# Patient Record
Sex: FEMALE | Race: WHITE
[De-identification: names, ages, dates, MRNs, and addresses within clinical notes are randomized per-mention and may not be internally consistent; named-entity substitution may affect disease eponyms.]

---

## 2020-07-27 ENCOUNTER — HOSPITAL ENCOUNTER (OUTPATIENT)
Dept: HOSPITAL 53 - M SFHCPLAZ | Age: 30
End: 2020-07-27
Attending: OBSTETRICS & GYNECOLOGY
Payer: COMMERCIAL

## 2020-07-27 DIAGNOSIS — N89.8: Primary | ICD-10-CM

## 2020-07-27 PROCEDURE — 36415 COLL VENOUS BLD VENIPUNCTURE: CPT

## 2020-07-27 PROCEDURE — 84443 ASSAY THYROID STIM HORMONE: CPT

## 2020-07-27 PROCEDURE — 85027 COMPLETE CBC AUTOMATED: CPT

## 2020-08-05 ENCOUNTER — HOSPITAL ENCOUNTER (OUTPATIENT)
Dept: HOSPITAL 53 - M LAB REF | Age: 30
End: 2020-08-05
Attending: PHYSICIAN ASSISTANT
Payer: COMMERCIAL

## 2020-08-05 DIAGNOSIS — N39.0: Primary | ICD-10-CM

## 2020-08-21 LAB
HCT VFR BLD AUTO: 66.3 % (ref 36–47)
MCH RBC QN AUTO: 29 PG (ref 27–33)
MCHC RBC AUTO-ENTMCNC: 32.6 G/DL (ref 32–36.5)
MCV RBC AUTO: 89.1 FL (ref 80–96)
RBC # BLD AUTO: 7.44 10^6/UL (ref 4–5.4)
WBC # BLD AUTO: 6.7 10^3/UL (ref 4–10)

## 2020-08-22 LAB — HGB BLD-MCNC: 21.6 G/DL (ref 12–15.5)

## 2020-09-03 ENCOUNTER — HOSPITAL ENCOUNTER (OUTPATIENT)
Dept: HOSPITAL 53 - M RAD | Age: 30
End: 2020-09-03
Attending: OBSTETRICS & GYNECOLOGY
Payer: COMMERCIAL

## 2020-09-03 DIAGNOSIS — N93.9: Primary | ICD-10-CM

## 2020-09-03 DIAGNOSIS — R10.2: ICD-10-CM

## 2020-09-08 ENCOUNTER — HOSPITAL ENCOUNTER (OUTPATIENT)
Dept: HOSPITAL 53 - M SFHCWAGY | Age: 30
End: 2020-09-08
Attending: OBSTETRICS & GYNECOLOGY
Payer: COMMERCIAL

## 2020-09-08 DIAGNOSIS — N93.9: Primary | ICD-10-CM

## 2020-09-10 ENCOUNTER — HOSPITAL ENCOUNTER (OUTPATIENT)
Dept: HOSPITAL 53 - M LAB REF | Age: 30
End: 2020-09-10
Attending: NURSE PRACTITIONER
Payer: COMMERCIAL

## 2020-09-10 DIAGNOSIS — R80.9: Primary | ICD-10-CM

## 2020-09-23 ENCOUNTER — HOSPITAL ENCOUNTER (OUTPATIENT)
Dept: HOSPITAL 53 - M LABSMTC | Age: 30
End: 2020-09-23
Attending: ANESTHESIOLOGY
Payer: COMMERCIAL

## 2020-09-23 DIAGNOSIS — Z20.828: ICD-10-CM

## 2020-09-23 DIAGNOSIS — Z01.812: Primary | ICD-10-CM

## 2020-09-25 ENCOUNTER — HOSPITAL ENCOUNTER (OUTPATIENT)
Dept: HOSPITAL 53 - M LAB | Age: 30
End: 2020-09-25
Attending: ADVANCED PRACTICE MIDWIFE
Payer: COMMERCIAL

## 2020-09-25 DIAGNOSIS — N93.9: Primary | ICD-10-CM

## 2020-09-25 LAB
ALBUMIN SERPL BCG-MCNC: 3.6 GM/DL (ref 3.2–5.2)
ALT SERPL W P-5'-P-CCNC: 17 U/L (ref 12–78)
BASOPHILS # BLD AUTO: 0 10^3/UL (ref 0–0.2)
BASOPHILS NFR BLD AUTO: 0.6 % (ref 0–1)
BILIRUB SERPL-MCNC: 1.2 MG/DL (ref 0.2–1)
BUN SERPL-MCNC: 15 MG/DL (ref 7–18)
CALCIUM SERPL-MCNC: 9.2 MG/DL (ref 8.5–10.1)
CHLORIDE SERPL-SCNC: 108 MEQ/L (ref 98–107)
CO2 SERPL-SCNC: 27 MEQ/L (ref 21–32)
CREAT SERPL-MCNC: 0.82 MG/DL (ref 0.55–1.3)
EOSINOPHIL # BLD AUTO: 0.2 10^3/UL (ref 0–0.5)
EOSINOPHIL NFR BLD AUTO: 2.2 % (ref 0–3)
GFR SERPL CREATININE-BSD FRML MDRD: > 60 ML/MIN/{1.73_M2} (ref 60–?)
GLUCOSE SERPL-MCNC: 94 MG/DL (ref 70–100)
HCT VFR BLD AUTO: 41 % (ref 36–47)
HGB BLD-MCNC: 13.4 G/DL (ref 12–15.5)
LYMPHOCYTES # BLD AUTO: 2.6 10^3/UL (ref 1.5–5)
LYMPHOCYTES NFR BLD AUTO: 38.3 % (ref 24–44)
MCH RBC QN AUTO: 28.9 PG (ref 27–33)
MCHC RBC AUTO-ENTMCNC: 32.7 G/DL (ref 32–36.5)
MCV RBC AUTO: 88.6 FL (ref 80–96)
MONOCYTES # BLD AUTO: 0.5 10^3/UL (ref 0–0.8)
MONOCYTES NFR BLD AUTO: 7.6 % (ref 0–5)
NEUTROPHILS # BLD AUTO: 3.4 10^3/UL (ref 1.5–8.5)
NEUTROPHILS NFR BLD AUTO: 51.3 % (ref 36–66)
PLATELET # BLD AUTO: 289 10^3/UL (ref 150–450)
POTASSIUM SERPL-SCNC: 4.7 MEQ/L (ref 3.5–5.1)
PROT SERPL-MCNC: 6.8 GM/DL (ref 6.4–8.2)
RBC # BLD AUTO: 4.63 10^6/UL (ref 4–5.4)
SODIUM SERPL-SCNC: 140 MEQ/L (ref 136–145)
WBC # BLD AUTO: 6.7 10^3/UL (ref 4–10)

## 2020-09-28 ENCOUNTER — HOSPITAL ENCOUNTER (OUTPATIENT)
Dept: HOSPITAL 53 - M SDC | Age: 30
Discharge: HOME | End: 2020-09-28
Attending: OBSTETRICS & GYNECOLOGY
Payer: COMMERCIAL

## 2020-09-28 VITALS — SYSTOLIC BLOOD PRESSURE: 125 MMHG | DIASTOLIC BLOOD PRESSURE: 75 MMHG

## 2020-09-28 VITALS — DIASTOLIC BLOOD PRESSURE: 60 MMHG | SYSTOLIC BLOOD PRESSURE: 108 MMHG

## 2020-09-28 VITALS — DIASTOLIC BLOOD PRESSURE: 58 MMHG | SYSTOLIC BLOOD PRESSURE: 110 MMHG

## 2020-09-28 VITALS — SYSTOLIC BLOOD PRESSURE: 108 MMHG | DIASTOLIC BLOOD PRESSURE: 61 MMHG

## 2020-09-28 VITALS — HEIGHT: 69 IN | BODY MASS INDEX: 25.77 KG/M2 | WEIGHT: 174.01 LBS

## 2020-09-28 VITALS — DIASTOLIC BLOOD PRESSURE: 67 MMHG | SYSTOLIC BLOOD PRESSURE: 123 MMHG

## 2020-09-28 DIAGNOSIS — Q87.81: ICD-10-CM

## 2020-09-28 DIAGNOSIS — Z88.5: ICD-10-CM

## 2020-09-28 DIAGNOSIS — E28.39: ICD-10-CM

## 2020-09-28 DIAGNOSIS — N93.9: Primary | ICD-10-CM

## 2020-09-28 DIAGNOSIS — F32.9: ICD-10-CM

## 2020-09-28 DIAGNOSIS — R10.2: ICD-10-CM

## 2020-09-28 DIAGNOSIS — Z79.899: ICD-10-CM

## 2020-09-28 DIAGNOSIS — N72: ICD-10-CM

## 2020-09-28 DIAGNOSIS — F41.9: ICD-10-CM

## 2020-09-28 LAB
HCT VFR BLD AUTO: 39.4 % (ref 36–47)
HGB BLD-MCNC: 13.2 G/DL (ref 12–15.5)
MCH RBC QN AUTO: 29.2 PG (ref 27–33)
MCHC RBC AUTO-ENTMCNC: 33.5 G/DL (ref 32–36.5)
MCV RBC AUTO: 87.2 FL (ref 80–96)
PLATELET # BLD AUTO: 270 10^3/UL (ref 150–450)
RBC # BLD AUTO: 4.52 10^6/UL (ref 4–5.4)
WBC # BLD AUTO: 5.6 10^3/UL (ref 4–10)

## 2020-09-28 PROCEDURE — 81025 URINE PREGNANCY TEST: CPT

## 2020-09-28 PROCEDURE — 36415 COLL VENOUS BLD VENIPUNCTURE: CPT

## 2020-09-28 PROCEDURE — 96374 THER/PROPH/DIAG INJ IV PUSH: CPT

## 2020-09-28 PROCEDURE — 86901 BLOOD TYPING SEROLOGIC RH(D): CPT

## 2020-09-28 PROCEDURE — 58571 TLH W/T/O 250 G OR LESS: CPT

## 2020-09-28 PROCEDURE — 88307 TISSUE EXAM BY PATHOLOGIST: CPT

## 2020-09-28 PROCEDURE — 85027 COMPLETE CBC AUTOMATED: CPT

## 2020-09-28 PROCEDURE — 96375 TX/PRO/DX INJ NEW DRUG ADDON: CPT

## 2020-09-28 PROCEDURE — 96361 HYDRATE IV INFUSION ADD-ON: CPT

## 2020-09-28 PROCEDURE — 86900 BLOOD TYPING SEROLOGIC ABO: CPT

## 2020-09-28 PROCEDURE — 86850 RBC ANTIBODY SCREEN: CPT

## 2020-09-28 RX ADMIN — FENTANYL CITRATE PRN MCG: 50 INJECTION, SOLUTION INTRAMUSCULAR; INTRAVENOUS at 12:16

## 2020-09-28 RX ADMIN — FENTANYL CITRATE PRN MCG: 50 INJECTION, SOLUTION INTRAMUSCULAR; INTRAVENOUS at 12:00

## 2020-09-28 RX ADMIN — FENTANYL CITRATE PRN MCG: 50 INJECTION, SOLUTION INTRAMUSCULAR; INTRAVENOUS at 12:11

## 2020-09-28 RX ADMIN — FENTANYL CITRATE PRN MCG: 50 INJECTION, SOLUTION INTRAMUSCULAR; INTRAVENOUS at 12:06

## 2020-09-28 NOTE — REP
PELVIC ULTRASOUND 



CLINICAL: Pelvic pain and abnormal pelvic bleeding. 



TECHNIQUE: Transabdominal pelvic ultrasound followed by transvaginal examination
for better evaluation of the endometrium and adnexa.  



FINDINGS: 

Normal anteverted uterus measuring 7.6 x 3.7 x 4.9 cm. The endometrial complex 
measures 11.6 mm thickness. No discrete uterine or endometrial abnormalities are
appreciated. 



Bilateral ovaries are not visualized on either transabdominal or transvaginal 
imaging. No pelvic fluid or adnexal mass lesion is appreciated. 



The bladder is normal and measures 10.2 x 2.4 x 6.4 cm.   



IMPRESSION: 

* Normal appearance to the uterus. 

* Ovaries not visualized. 

* No pelvic fluid or mass.   

MTDD

## 2020-09-28 NOTE — ROOPDOC
[facility] Report Of Operation


Report of Operation


DATE OF PROCEDURE: 09/28/2020





PREPROCEDURE DIAGNOSES: Abnormal uterine bleeding





POSTPROCEDURE DIAGNOSES: Same.





PROCEDURE: Robotic-assisted total laparoscopic hysterectomy, bilateral 

salpingectomy, cystoscopy





SURGEON: Sukhjinder Hughes D.O. FACOG





ASSISTANT: Nithya Mendoza





ANESTHESIA: General endotracheal.





ESTIMATED BLOOD LOSS: Approximately 50 mL. 





FLUIDS REPLACED: 1000 mL LR





URINE OUTPUT: 75 mL





COMPLICATIONS: None. 





FINDINGS: Attenuated streak ovaries bilaterally; consistent with her diagnosis 

of premature ovarian failure.  Uterus was approximately, 8 centimeters in 

greatest dimension.  Cystoscopy: Bilateral ureteral orifice efflux, no bladder 

injury/suture material.





PREOPERATIVE ANTIBIOTIC PROPHYLAXIS: Ancef 2 g IV 1. 





SPECIMEN(S): Uterus w/ cervix, bilateral fallopian tubes





DESCRIPTION OF PROCEDURE:





The patient was counseled, consented on the respective benefits, indications, 

alternatives of procedure. Informed consent was obtained. She was taken to the 

operating room with an IV running. She was placed on the operating table in 

dorsal supine position. Gen. anesthesia was administered and the airway was 

secured without any difficulty. She was placed in the low lithotomy position.  

She was prepared and draped in the normal sterile fashion. A time out was 

performed per protocol.  





A Bullock catheter was placed under sterile conditions. A sterile speculum was 

placed resulting in good visualization of the cervix.  A single-tooth tenaculum 

was used to grasp the anterior lip cervix.  The cervix was sequentially dilated 

with Abdirizak dilators. A V-Care uterine manipulator was placed without any 

difficulty. The single-tooth tenaculum was removed, as well as the speculum. A 

sterile glove switch was performed.  Attention was turned to the abdomen.





An 8mm incision was made at Worrell's point and through this incision a Veress 

needle was inserted into the intraperitoneal cavity. Intraperitoneal placement 

was confirmed with ease of flow of normal saline, positive drop test, no return 

on aspiration, and an opening pressure of less than 10 mmHg upon initial insuf

flation.  The abdomen was insufflated with 2 L of gas.  The Veress needle was 

removed.  Through this incision, the robotic trochar/cannula was inserted into 

the intraperitoneal cavity under direct visualization.  No incidental bleeding 

nor injury was noted.  Patient was placed in 30 Trendelenburg.  The right and 

left trocars/cannulas were placed on both the right and left side through 8 mm 

incisions, guided by laparoscopic visualization.  No incidental bleeding nor 

injury was noted.  The robot was docked in typical fashion.  The instruments 

were inserted, guided by laparoscopic visualization.





My attention was turned to the robotic console.  Using the vessel sealer device,

the right and left fallopian tubes were amputated.  The fallopian tubes were 

brought through the assist-port cannula without any difficulty.  The right 

utero-ovarian ligament and right round ligament were sequentially clamped, 

coagulated and transected with the vessel sealer device.  The vesicouterine 

peritoneum and lower segment uterine to bladder adhesions were dissected with 

the vessel sealer device to create the bladder flap, thus mobilizing the lower 

uterine segment and cervix off of the bladder. The right uterine vasculature was

sequentially clamped, coagulated and transected above and medial to the rim of 

the colpotomy cup. The left utero-ovarian ligament and left round ligament were 

sequentially clamped, coagulated and transected with the vessel sealer device. 

The remainder of the bladder flap was dissected using the vessel sealer device 

and blunt dissection.  The left uterine vasculature was sequentially clamped, 

coagulated and transected above and medial to the rim of the colpotomy cup.  The

outline of the entire V- care colpotomy cup was able to be delineated.  

Excellent blanching of the uterus was noted.  A circumferential colpotomy was 

performed using the da Aye monopolar giuseppe, following the contour of the cup.

 The amputated cervix and uterus were brought through the colpotomy into and out

of the vagina, intact as one unit.   The colpotomy was closed with the V-lock 

barbed suture in running fashion, thus creating the vaginal cuff.  Excellent 

hemostasis was noted throughout the steps above.  Rolando was placed over the 

vaginal cuff to ensure hemostasis.  The instruments were removed from the 

abdomen and the robot was un-docked.  The gas was released from the abdomen and 

the patient was taken out of Trendelenburg.





I re-scrubbed, and attention was turned to the pelvis.  The Bullock catheter was 

removed.  The cystoscope was placed transurethrally into the bladder and normal 

saline was instilled.  No bladder injury/suture material was noted.  IV 

methylene blue had been administered by anesthesia and bilateral UO efflux was 

confirmed.  The fluid was drained out of the bladder through the cystoscope 

device, then the cystoscope was removed.  The vagina was copiously irrigated.  A

sterile digital vaginal exam revealed no significant bleeding and an intact 

vaginal cuff.





A sterile glove switch was performed.  The da Aye cannulas were removed.  The 

skin incisions were closed with 4-0 Monocryl in subcuticular fashion.  Sponge, 

needle and instrument counts were correct per protocol.  The patient tolerated 

the entire procedure very well.  She was transferred to the PACU in good and 

stable condition.





DO DONTRELL Zapata JONATHAN R. DO           Sep 28, 2020 11:57

## 2020-10-07 ENCOUNTER — HOSPITAL ENCOUNTER (OUTPATIENT)
Dept: HOSPITAL 53 - M LAB | Age: 30
End: 2020-10-07
Attending: FAMILY MEDICINE
Payer: COMMERCIAL

## 2020-10-07 DIAGNOSIS — E06.3: ICD-10-CM

## 2020-10-07 DIAGNOSIS — R53.83: ICD-10-CM

## 2020-10-07 DIAGNOSIS — M25.50: Primary | ICD-10-CM

## 2020-10-07 LAB
BASOPHILS # BLD AUTO: 0 10^3/UL (ref 0–0.2)
BASOPHILS NFR BLD AUTO: 0.4 % (ref 0–1)
CRP SERPL-MCNC: < 0.3 MG/DL (ref 0–0.3)
EOSINOPHIL # BLD AUTO: 0.2 10^3/UL (ref 0–0.5)
EOSINOPHIL NFR BLD AUTO: 2.9 % (ref 0–3)
ERYTHROCYTE [SEDIMENTATION RATE] IN BLOOD BY WESTERGREN METHOD: 17 MM/HR (ref 0–20)
FERRITIN SERPL-MCNC: 86 NG/ML (ref 8–252)
HCT VFR BLD AUTO: 39.6 % (ref 36–47)
HGB BLD-MCNC: 12.7 G/DL (ref 12–15.5)
IRON SATN MFR SERPL: 29 % (ref 13.2–45)
IRON SERPL-MCNC: 89 UG/DL (ref 50–170)
LYMPHOCYTES # BLD AUTO: 2 10^3/UL (ref 1.5–5)
LYMPHOCYTES NFR BLD AUTO: 26.6 % (ref 24–44)
MCH RBC QN AUTO: 28.3 PG (ref 27–33)
MCHC RBC AUTO-ENTMCNC: 32.1 G/DL (ref 32–36.5)
MCV RBC AUTO: 88.2 FL (ref 80–96)
MONOCYTES # BLD AUTO: 0.5 10^3/UL (ref 0–0.8)
MONOCYTES NFR BLD AUTO: 6.3 % (ref 0–5)
NEUTROPHILS # BLD AUTO: 4.8 10^3/UL (ref 1.5–8.5)
NEUTROPHILS NFR BLD AUTO: 63.5 % (ref 36–66)
PLATELET # BLD AUTO: 332 10^3/UL (ref 150–450)
RBC # BLD AUTO: 4.49 10^6/UL (ref 4–5.4)
RHEUMATOID FACT SERPL-ACNC: < 10 IU/ML (ref ?–15)
T4 FREE SERPL-MCNC: 1.02 NG/DL (ref 0.76–1.46)
TIBC SERPL-MCNC: 307 UG/DL (ref 250–450)
TSH SERPL DL<=0.005 MIU/L-ACNC: 1.04 UIU/ML (ref 0.36–3.74)
WBC # BLD AUTO: 7.5 10^3/UL (ref 4–10)

## 2020-10-09 LAB
CCP IGG SERPL-ACNC: 6 UNITS (ref 0–19)
GLIADIN PEPTIDE IGA SER-ACNC: 3 UNITS (ref 0–19)
GLIADIN PEPTIDE IGG SER-ACNC: 3 UNITS (ref 0–19)

## 2020-10-28 ENCOUNTER — HOSPITAL ENCOUNTER (OUTPATIENT)
Dept: HOSPITAL 53 - M PAIN | Age: 30
End: 2020-10-28
Attending: NURSE PRACTITIONER
Payer: COMMERCIAL

## 2020-10-28 DIAGNOSIS — J30.81: ICD-10-CM

## 2020-10-28 DIAGNOSIS — M47.817: Primary | ICD-10-CM

## 2020-10-28 DIAGNOSIS — M81.0: ICD-10-CM

## 2020-10-28 DIAGNOSIS — N28.1: ICD-10-CM

## 2020-10-28 DIAGNOSIS — G43.909: ICD-10-CM

## 2020-10-28 DIAGNOSIS — Z88.8: ICD-10-CM

## 2020-10-28 DIAGNOSIS — Z79.899: ICD-10-CM

## 2020-10-28 DIAGNOSIS — Z88.5: ICD-10-CM
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
## 2020-11-04 ENCOUNTER — HOSPITAL ENCOUNTER (OUTPATIENT)
Dept: HOSPITAL 53 - M LABSMTC | Age: 30
End: 2020-11-04
Attending: ANESTHESIOLOGY
Payer: COMMERCIAL

## 2020-11-04 DIAGNOSIS — Z20.828: Primary | ICD-10-CM

## 2020-11-09 ENCOUNTER — HOSPITAL ENCOUNTER (OUTPATIENT)
Dept: HOSPITAL 53 - M PAIN | Age: 30
End: 2020-11-09
Attending: ANESTHESIOLOGY
Payer: COMMERCIAL

## 2020-11-09 DIAGNOSIS — Z88.8: ICD-10-CM

## 2020-11-09 DIAGNOSIS — G43.909: ICD-10-CM

## 2020-11-09 DIAGNOSIS — Z88.5: ICD-10-CM

## 2020-11-09 DIAGNOSIS — M47.816: Primary | ICD-10-CM

## 2020-11-09 DIAGNOSIS — Z79.899: ICD-10-CM

## 2020-11-09 DIAGNOSIS — M47.817: ICD-10-CM

## 2020-11-09 PROCEDURE — 64494 INJ PARAVERT F JNT L/S 2 LEV: CPT

## 2020-11-09 PROCEDURE — 64493 INJ PARAVERT F JNT L/S 1 LEV: CPT

## 2020-11-10 NOTE — REP
INDICATION:

DIAGNOSTIC # 1, BILATERAL L4/L5; L5/S1.



COMPARISON:

None.



TECHNIQUE:

Four views views.  73.1 seconds of fluoroscopy time is reported.



FINDINGS:

A sequence of 4 last image hold fluoroscopically obtained spot radiograph(s) of the

lumbar spine document(s) needle position(s) and contrast injection associated with

injection procedure.



IMPRESSION:

Procedural imaging.





<Electronically signed by Maximus Davis > 11/10/20 0865
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
## 2020-11-23 ENCOUNTER — HOSPITAL ENCOUNTER (OUTPATIENT)
Dept: HOSPITAL 53 - M PAIN | Age: 30
End: 2020-11-23
Attending: NURSE PRACTITIONER
Payer: COMMERCIAL

## 2020-11-23 DIAGNOSIS — Z53.29: Primary | ICD-10-CM

## 2020-11-25 ENCOUNTER — HOSPITAL ENCOUNTER (OUTPATIENT)
Dept: HOSPITAL 53 - M LAB | Age: 30
End: 2020-11-25
Attending: PHYSICIAN ASSISTANT
Payer: COMMERCIAL

## 2020-11-25 DIAGNOSIS — H70.001: ICD-10-CM

## 2020-11-25 DIAGNOSIS — Z01.812: Primary | ICD-10-CM

## 2020-11-25 LAB
BUN SERPL-MCNC: 16 MG/DL (ref 7–18)
CALCIUM SERPL-MCNC: 9.5 MG/DL (ref 8.5–10.1)
CHLORIDE SERPL-SCNC: 107 MEQ/L (ref 98–107)
CO2 SERPL-SCNC: 30 MEQ/L (ref 21–32)
CREAT SERPL-MCNC: 0.86 MG/DL (ref 0.55–1.3)
GFR SERPL CREATININE-BSD FRML MDRD: > 60 ML/MIN/{1.73_M2} (ref 60–?)
GLUCOSE SERPL-MCNC: 96 MG/DL (ref 70–100)
POTASSIUM SERPL-SCNC: 4.3 MEQ/L (ref 3.5–5.1)
SODIUM SERPL-SCNC: 141 MEQ/L (ref 136–145)

## 2020-12-01 ENCOUNTER — HOSPITAL ENCOUNTER (OUTPATIENT)
Dept: HOSPITAL 53 - M RAD | Age: 30
End: 2020-12-01
Attending: PHYSICIAN ASSISTANT
Payer: COMMERCIAL

## 2020-12-01 ENCOUNTER — HOSPITAL ENCOUNTER (OUTPATIENT)
Dept: HOSPITAL 53 - M PAIN | Age: 30
End: 2020-12-01
Attending: NURSE PRACTITIONER
Payer: COMMERCIAL

## 2020-12-01 DIAGNOSIS — G43.909: ICD-10-CM

## 2020-12-01 DIAGNOSIS — M47.816: ICD-10-CM

## 2020-12-01 DIAGNOSIS — Z88.5: ICD-10-CM

## 2020-12-01 DIAGNOSIS — Z79.899: ICD-10-CM

## 2020-12-01 DIAGNOSIS — Z88.8: ICD-10-CM

## 2020-12-01 DIAGNOSIS — M47.816: Primary | ICD-10-CM

## 2020-12-01 DIAGNOSIS — H70.001: Primary | ICD-10-CM

## 2020-12-01 PROCEDURE — 70480 CT ORBIT/EAR/FOSSA W/O DYE: CPT

## 2020-12-01 NOTE — REPVR
PROCEDURE INFORMATION: 

Exam: CT Temporal Bones Without Contrast. 

Exam date and time: 12/1/2020 10:15 AM 

Age: 30 years old 

Clinical indication: Pain; Other: RT ear; Additional info: Mastoiditis 



TECHNIQUE: 

Imaging protocol: Computed tomography images of the temporal bones without 

contrast. 

Radiation optimization: All CT scans at this facility use at least one of these 

dose optimization techniques: automated exposure control; mA and/or kV 

adjustment per patient size (includes targeted exams where dose is matched to 

clinical indication); or iterative reconstruction. 



COMPARISON: 

No relevant prior studies available. 



FINDINGS: 

Right inner ear: Normal. 

Right ossicles and middle ear: Normal. The middle ear ossicles are intact. 

Right external auditory canal: Normal. 

Right facial nerve canal: Normal. 

Right jugular foramen: No jugular dehiscence. 

Right carotid canal: No aberrent carotid canal. 

Right mastoid air cells: Normal. No mastoid effusions. 



Left inner ear: Normal. 

Left ossicles and middle ear: Normal. The middle ear ossicles are intact. 

Left external auditory canal: Normal. 

Left facial nerve canal: Normal. 

Left jugular foramen: No jugular dehiscence. 

Left carotid canal: No aberrent carotid canal. 

Left mastoid air cells: Normal. No mastoid effusions. 



Paranasal sinuses: Retention cyst or polyp in the right maxillary sinus. 



Soft tissues: The left facial canal is dehiscent. 



IMPRESSION: 

No evidence of mastoiditis. 



Electronically signed by: Selena Gamboa On 12/01/2020  10:42:38 AM
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
## 2021-01-07 ENCOUNTER — HOSPITAL ENCOUNTER (OUTPATIENT)
Dept: HOSPITAL 53 - M LABSMTC | Age: 31
End: 2021-01-07
Attending: ANESTHESIOLOGY
Payer: COMMERCIAL

## 2021-01-07 DIAGNOSIS — Z20.822: Primary | ICD-10-CM

## 2021-01-12 ENCOUNTER — HOSPITAL ENCOUNTER (OUTPATIENT)
Dept: HOSPITAL 53 - M PAIN | Age: 31
End: 2021-01-12
Attending: ANESTHESIOLOGY
Payer: COMMERCIAL

## 2021-01-12 DIAGNOSIS — Z79.899: ICD-10-CM

## 2021-01-12 DIAGNOSIS — M47.817: ICD-10-CM

## 2021-01-12 DIAGNOSIS — G43.909: ICD-10-CM

## 2021-01-12 DIAGNOSIS — Z88.5: ICD-10-CM

## 2021-01-12 DIAGNOSIS — Z88.8: ICD-10-CM

## 2021-01-12 DIAGNOSIS — M47.816: Primary | ICD-10-CM

## 2021-01-12 PROCEDURE — 64493 INJ PARAVERT F JNT L/S 1 LEV: CPT

## 2021-01-12 PROCEDURE — 64494 INJ PARAVERT F JNT L/S 2 LEV: CPT

## 2021-01-12 NOTE — REP
INDICATION:

RIGHT DIAGNOSTIC #2.



COMPARISON:

None



TECHNIQUE:

Intraoperative fluoroscopic imaging using portable C-arm technique



FINDINGS:

Images demonstrate the patient to be status post lumbar facet block.  Total

fluoroscopic time 26.4 seconds



IMPRESSION:

Satisfactory lumbar facet block images





<Electronically signed by Lakhwinder Welsh > 01/12/21 [id number]
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
## 2021-01-26 ENCOUNTER — HOSPITAL ENCOUNTER (OUTPATIENT)
Dept: HOSPITAL 53 - M PAIN | Age: 31
End: 2021-01-26
Attending: NURSE PRACTITIONER
Payer: COMMERCIAL

## 2021-01-26 DIAGNOSIS — G89.29: Primary | ICD-10-CM

## 2021-01-26 DIAGNOSIS — M81.0: ICD-10-CM

## 2021-01-26 DIAGNOSIS — M47.816: ICD-10-CM

## 2021-01-26 DIAGNOSIS — Z79.899: ICD-10-CM

## 2021-01-26 DIAGNOSIS — Z88.5: ICD-10-CM

## 2021-01-26 DIAGNOSIS — Z88.8: ICD-10-CM

## 2021-01-26 DIAGNOSIS — G43.909: ICD-10-CM

## 2021-01-26 DIAGNOSIS — J30.81: ICD-10-CM

## 2021-01-28 NOTE — ECWPNPC
PATIENT NAME: COBY SHIELDS

: 1990

GENDER: FEMALE

MRN: [id number]

VISIT DATE: 2021

DISCHARGE DATE: 21 1229

VISIT LOCKED DATE TIME: 

PHYSICIAN: MANJULA BARRON

RESOURCE: MANJULA BARRON

 

           

           

REASON FOR APPOINTMENT

           

          1. RIGHT DIAGNOSTIC LUMBAR FACET BLOCK L4-5, L5-S1 #2

           

HISTORY OF PRESENT ILLNESS

           

      GENERAL:  HERE FOR POST PROCEDURE FOLLOW-UP. HAD

          RIGHT L4-5, L5-S1 DIAGNOSTIC LUMBAR FACET BLOCK #2 ON 2021.

          HOURLY PAIN DIARY IS REVIEWED. THIS IS SHOWING A 80% REDUCTION IN

          PAIN FOR 24 HOURS POSTPROCEDURE THEN PAIN RETURNED TO BASELINE.

          REPORTS THAT DURING THE 24 HOURS POST PROCEDURE SHE WAS ABLE TO

          INTERACT WITH HER CHILDREN AND ANIMALS WITHOUT INCREASED PAIN FOR

          THE FIRST TIME IN SEVERAL YEARS. REVIEWED RADIOFREQUENCY

          PROCEDURE.

           - -.

           

      FALL RISK SCREENING:

      SCREENING

           

           

          :NO FALLS REPORTED IN THE LAST YEAR

           

      PAIN SCREENING:

      PATIENT HAS A COMPLAINT OF ACUTE OR CHRONIC PAIN

           

           

          :YES

          LOCATION OF PAIN:LOW BACK, LEFT HIP, RIGHT HIP

          INTENSITY OF PAIN (SCALE OF 1 TO 10):7

          WHAT DOES YOUR PAIN FEEL LIKE:ACHING, INTERMITTENT, STABBING

          DURATION:INTERMITTENT

          PAIN IS INCREASED BY:ACTIVITIES

          PAIN IS DECREASED BY:OTHERS HOT BATHS, HEAT

          TREATMENT/MEDICATIONS USED TO MANAGE PAIN:OTC PAIN RELIEVERS,

          NSAIDS, PHYSICAL THERAPY

          LEVEL OF RELIEF FROM PAIN TREATMENTS IN THE PAST:25%

           

      NURSING NOTE:

           -.

           

      PAIN CENTER INTAKE QUESTIONS:

      DO YOU HAVE A HISTORY OF MRSA?

           

           

          :NO

           

      DO YOU TAKE A BLOOD THINNERS?

           

           

          :NO

           

      DO YOU HAVE ANY BLEEDING DISORDERS?

           

           

          :NO

           

      ANY NEW NUMBNESS OR WEAKNESS IN YOUR LEGS OR ARMS?

           

           

          :NO

           

      ANY PACEMAKER,DEFIBRILLATOR, OR DORSAL COLUMN

          STIMULATOR?

           

           

          :NO

           

      DO YOU HAVE ANY RASHES OR OPEN SORES?

           

           

          :NO

           

      ARE YOU ALLERGIC TO IV DYE?

           

           

          :NO

           

      ARE YOU DIABETIC?

           

           

          :NO

           

      ANY NEW PROBLEMS WITH YOUR MEDICATIONS?

           

           

          :NO

           

      HAVE YOU RECEIVED A VACCINE IN THE PAST 30 DAYS?

           

           

          :NO

           

      DO YOU PLAN TO RECEIVE A VACCINE IN THE NEXT 21

          DAYS?

           

           

          :NO

           

      DO YOU NEED ANY PRESCRIPTION?

           

           

          :NO

           

      DO YOU TAKE ANY IMMUNOSUPPRESSIVE MEDICATIONS?

           

           

          :NO

           

      IS THERE A CHANCE YOU COULD BE PREGNANT?

           

           

          :NO

           

      ARE YOU BREAST FEEDING?

           

           

          :NO

           

CURRENT MEDICATIONS

           

          NOT-TAKING GABAPENTIN 400 MG CAPSULE 1 CAPSULE ORALLY ONCE A DAY

          NOT-TAKING HYDROXYZINE HCL 25 MG TABLET 1 TABLET AS NEEDED ORALLY

          EVERY 8 HRS, NOTES: NOT LATELY

          UNKNOWN FLONASE ALLERGY RELIEF 50 MCG/ACT SUSPENSION 1 SPRAY IN

          EACH NOSTRIL NASALLY ONCE A DAY

          UNKNOWN MELATONIN 3 MG TABLET AS DIRECTED ORALLY

          UNKNOWN CALCIUM 1000 + D 1000-800 MG-UNIT TABLET 1 TABLET WITH A

          MEAL ORALLY ONCE A DAY

          UNKNOWN WELLBUTRIN  MG TABLET EXTENDED RELEASE 24 HOUR 1

          TABLET IN THE MORNING ORALLY ONCE A DAY

          UNKNOWN AMBIEN 10 MG TABLET 1 TABLET AT BEDTIME AS NEEDED ORALLY

          ONCE A DAY

          UNKNOWN VITAMIN D 1000 UNIT TABLET 1 TABLET ORALLY ONCE A DAY

          UNKNOWN ZOLOFT 25 MG TABLET 1 TABLET ORALLY ONCE A DAY

          UNKNOWN CEFACLOR 250 MG CAPSULE 1 CAPSULE ORALLY EVERY 8 HRS AS

          NEEDED, NOTES: NONE IN 3 MOS

          UNKNOWN GABAPENTIN 600 MG TABLET 1 TABLET ORALLY BID

          UNKNOWN NORETHINDRONE ACETATE 5 MG TABLET 1 TABLET ORALLY ONCE A

          DAY

          UNKNOWN ESTRACE 2 MG TABLET 1 TABLET ORALLY ONCE A DAY

          UNKNOWN NORETHINDRONE ACETATE 5 MG TABLET 1 TABLET ORALLY ONCE A

          DAY

          UNKNOWN HYDRALAZINE HCL 25 MG TABLET 1 TABLET WITH FOOD ORALLY

          THREE TIMES A DAY

          MEDICATION LIST REVIEWED AND RECONCILED WITH THE PATIENT

           

PAST MEDICAL HISTORY

           

          UTIS

          KIDNEY CYST

          CHRONIC BACK PAIN

          MIGRAINE

          PREMATURE OVARIAN FAILURE

          OSTEOPOROSIS

          DEGENERATIVE DISK DISEASE

          ALPORTS SYNDROME

           

ALLERGIES

           

          VICODIN: N/V, RASH - ALLERGY

          CAT ALLERGY: CONGESTION - ALLERGY

          LEXAPRO: PROLONGED QT

           

SOCIAL HISTORY

           

          GENERAL:

           

          TOBACCO USE

          ARE YOU A:NONSMOKER

           

           

          LATEX QUESTIONNAIRE

          LATEX ALLERGY : HAVE YOU EVER DEVELOPED ANY TYPE OF REACTION

          AFTER HANDLING LATEX PRODUCTS SUCH AS RUBBER GLOVES, CONDOMS,

          DIAPHRAGMS, BALLOONS, SOCKS, OR UNDERWEAR?NO

          LATEX ALLERGY : HAVE YOU EVER DEVELOPED ANY TYPE OF REACTION

          DURING OR AFTER DENTAL APPOINTMENT, VAGINAL/RECTAL EXAMINATION,

          SURGICAL PROCEDURE, OR ANY OTHER EXPOSURE?NO

          LATEX RISK : HAVE YOU EVER HAD ANY DIFFICULTY BREATHING OR HIVES

          AFTER EATING OR HANDLING ANY FRUITS, OR VEGETABLES; SUCH AS KIWI,

          BANANAS, STONE FRUITS, OR CHESTNUTSNO

          LATEX RISK : DO YOU HAVE A PREVIOUS PERSONAL HISTORY OF MORE THAN

          NINE SURGERIES, SPINA BIFIDA, OR REPEATED CATHERIZATIONS? NO

          LATEX RISK : ARE YOU FREQUENTLY EXPOSED TO LATEX PRODUCTS IN YOUR

          OCCUPATION?NO

          DATE ASKED : 2021

           

           

          ALCOHOL USE: NO.

           

           

          ALCOHOL SCREENING

          DID YOU HAVE A DRINK CONTAINING ALCOHOL IN THE PAST YEAR?NO

          POINTS0

          INTERPRETATIONNEGATIVE

           

           

          RECREATIONAL DRUG USE

          DRUG USE?NO

           

           

          CAFFEINE

          CAFFEINE USE?YES 2 CUPS DAY

           

           

          [employer]ian

          [id number] Catholic NO Hinduism BELIEFS THAT WOULD IMPACT

          HEALTH CARE.

           

           

          LANGUAGE

          LANGUAGES SPOKEN:ENGLISH

           

           

          LEARNING BARRIERS / SPECIAL NEEDS

          BARRIERS TO LEARNING?NO

          HEARING IMPAIRED?NO

          VISION IMPAIRED?YES

          :CORRECTIVE LENSES

          COGNITIVELY IMPAIRED?NO

          READINESS TO LEARN?YES

          LEARNING PREFERENCES?NO

          LEARNING CAPABILITIES PRESENT?YES

          EMOTIONAL BARRIERS?NO

          SPECIAL DEVICES?NO

           NEEDED?NO

           

           

          DOMESTIC VIOLENCE

          STATUS:

          DO YOU FEEL SAFE IN YOUR ENVIRONMENT?YES

           

           

          OCCUPATION: SELF EMPLOYED.

           

           

          DIET: REGULAR.

           

           

          EXERCISE: DAILY.

           

           

          MARITAL STATUS: .

           

           

          -

          HAS THE PATIENT BEEN EDUCATED REGARDING HIS/HER PLAN OF CARE?YES

          HAS THE PATIENT BEEN EDUCATED REGARDING PAIN, THE RISK FOR PAIN,

          THE IMPORTANCE OF EFFECTIVE PAIN MANAGEMENT, AND THE PAIN

          ASSESSMENT PROCESS?YES

           

           

          HOUSING: [employer]ENTS APARTMENT.

           

           

          ADVANCE DIRECTIVE

          ADVANCE DIRECTIVE DISCUSSED WITH PATIENT:YES DECLINED, DECLINED

          PAPERWORK

           

REVIEW OF SYSTEMS

           

      CONSTITUTIONAL:

           

          ANY RECENT FEVER    NO . CHILLS    NO . WEIGHT CHANGE OF UNKNOWN

          REASONS    NO .

           

      GASTROENTEROLOGY:

           

          NEW UNEXPLAINABLE CHANGES IN BOWEL CONTROL    NO . CONSTIPATION  

           NO .

           

      GENITOURINARY:

           

          ANY NEW CHANGE IN BLADDER CONTROL?    NO .

           

      NEUROLOGY:

           

          NEW ONSET DIZZINESS OR NEUROLOGICAL CHANGES NOT MENTIONED    NO .

          NEW NUMBNESS OR PAIN PATTERNS NOT MENTIONED AND PERTINENT TO

          TODAY'S VISIT    NO .

           

      CARDIOLOGY:

           

          NEW CHEST PRESSURE    NO . NEW CHEST PAIN    NO .

           

      RESPIRATORY:

           

          UNEXPLAINABLE COUGH    NO . NEW SHORTNESS OF BREATH    NO .

           

VITAL SIGNS

           

          .8 LBS, HT 70 IN, BMI 25.37 INDEX, /77 MM HG, HR 69

          /MIN, RR 18 /MIN, TEMP 98 F, OXYGEN SAT % 99%, SAFE IN ENV? (Y/N)

          YES, REVIEWED BY: INOCENTE NOVOA MA.

           

EXAMINATION

           

      GENERAL EXAMINATION:

          GENERAL AWAKE,ALERT ,PLEAASANT .

           

          PSYCH AFFECT NORMAL .

           

          LUNGS: LUNG FIELDS ARE CLEAR TO AUSCULTATION BILATERALLY. GOOD

          MOVEMENT OF AIR .

           

          HEART: S1, S2 IN A REGULAR RATE AND RHYTHM. NO SIGNIFICANT

          MURMURS, RUBS OR GALLOPS NOTED .

           

          LUMBAR:PALPATION:TENDER OVER BILAT. L4/5-L5/S1 LUMBAR FACETS WITH

          FACET LOADING..

           

ASSESSMENTS

           

          OTHER CHRONIC PAIN - G89.29 (PRIMARY)

           

          SPONDYLOSIS WITHOUT MYELOPATHY OR RADICULOPATHY, LUMBAR REGION -

          M47.816

           

TREATMENT

           

      OTHER CHRONIC PAIN

          PAIN PROCEDURE LOGDATE OF PROCEDURE1PROCEDURE:RIGHT

          DIAGNOSTIC LUMBAR FACET BLOCK #2 L4-5, L5-[id number] OF PRE

          SEDATENONERESULT:GREATER THAN 80% REDUCTION IN PAIN X24 HOURS

          NOTES: RIGHT COOL RADIOFREQUENCY L4-5, L5-S1.

           

PROCEDURE CODES

           

           ESTABILISHED PATIENT [facility] CHARGE

           

DISPOSITION & COMMUNICATION

           

FOLLOW UP

           

          POST PROCEDURE (REASON: RIGHT COOL RADIOFREQUENCY L4-5, L5-S1)

           

 

ELECTRONICALLY SIGNED BY MISAEL GIVENS ON

          2021 AT 12:59 PM EST

           

           

           

 

DISCLAIMER :

THIS IS A VISIT SUMMARY EXTRACTED FROM THE [employer] CHART.

IT IS NOT A COPY OF THE [employer] PROGRESS NOTE.

FADY

## 2021-02-04 ENCOUNTER — HOSPITAL ENCOUNTER (OUTPATIENT)
Dept: HOSPITAL 53 - M LABSMTC | Age: 31
End: 2021-02-04
Attending: ANESTHESIOLOGY
Payer: COMMERCIAL

## 2021-02-04 DIAGNOSIS — Z11.59: ICD-10-CM

## 2021-02-04 DIAGNOSIS — Z20.828: Primary | ICD-10-CM

## 2021-02-09 ENCOUNTER — HOSPITAL ENCOUNTER (OUTPATIENT)
Dept: HOSPITAL 53 - M LAB | Age: 31
End: 2021-02-09
Attending: PHYSICIAN ASSISTANT
Payer: COMMERCIAL

## 2021-02-09 ENCOUNTER — HOSPITAL ENCOUNTER (OUTPATIENT)
Dept: HOSPITAL 53 - M PAIN | Age: 31
End: 2021-02-09
Attending: ANESTHESIOLOGY
Payer: COMMERCIAL

## 2021-02-09 DIAGNOSIS — M25.50: ICD-10-CM

## 2021-02-09 DIAGNOSIS — G43.909: ICD-10-CM

## 2021-02-09 DIAGNOSIS — Z88.8: ICD-10-CM

## 2021-02-09 DIAGNOSIS — M47.816: Primary | ICD-10-CM

## 2021-02-09 DIAGNOSIS — M47.817: ICD-10-CM

## 2021-02-09 DIAGNOSIS — Z88.5: ICD-10-CM

## 2021-02-09 DIAGNOSIS — Z79.899: ICD-10-CM

## 2021-02-09 PROCEDURE — 36415 COLL VENOUS BLD VENIPUNCTURE: CPT

## 2021-02-09 PROCEDURE — 87798 DETECT AGENT NOS DNA AMP: CPT

## 2021-02-09 PROCEDURE — 86200 CCP ANTIBODY: CPT

## 2021-02-09 PROCEDURE — 86803 HEPATITIS C AB TEST: CPT

## 2021-02-09 PROCEDURE — 86704 HEP B CORE ANTIBODY TOTAL: CPT

## 2021-02-09 PROCEDURE — 86480 TB TEST CELL IMMUN MEASURE: CPT

## 2021-02-09 PROCEDURE — 64635 DESTROY LUMB/SAC FACET JNT: CPT

## 2021-02-09 PROCEDURE — 86038 ANTINUCLEAR ANTIBODIES: CPT

## 2021-02-09 PROCEDURE — 81374 HLA I TYPING 1 ANTIGEN LR: CPT

## 2021-02-09 PROCEDURE — 64636 DESTROY L/S FACET JNT ADDL: CPT

## 2021-02-09 NOTE — REP
INDICATION:

RIGHT COOL RADIOFREQUENCY L4-L5, L5-S1.



COMPARISON:

01/12/2021.



TECHNIQUE:

Five C-arm views lower lumbar spine performed during injection.



FINDINGS:

Needles are seen along the lower lumbar spine and lumbosacral junction.



IMPRESSION:

1 minutes 5 seconds fluoroscopy time utilized.





<Electronically signed by Roberto Carlos Hood > 02/09/21 1894

## 2021-02-11 NOTE — ECWPNPC
PATIENT NAME: COBY SHIELDS

: 1990

GENDER: FEMALE

MRN: [id number]

VISIT DATE: 2021

DISCHARGE DATE: 21 1523

VISIT LOCKED DATE TIME: 

PHYSICIAN: DIANA THOMPSON MD

RESOURCE: DIANA THOMPSON MD

 

           

           

REASON FOR APPOINTMENT

           

          1. RIGHT LUMBAR COOL RADIOFREQUENCY L4-L5, L5-S1

           

HISTORY OF PRESENT ILLNESS

           

      GENERAL:

           -.

           

      FALL RISK SCREENING:

      SCREENING

           

           

          :NO FALLS REPORTED IN THE LAST YEAR

           

      PAIN SCREENING:

      PATIENT HAS A COMPLAINT OF ACUTE OR CHRONIC PAIN

           

           

          :YES

          LOCATION OF PAIN:LOW BACK, LEFT HIP, RIGHT HIP

          INTENSITY OF PAIN (SCALE OF 1 TO 10):8

          WHAT DOES YOUR PAIN FEEL LIKE:ACHING, SHARP, THROBBING

          DURATION:CONTINOUS, CONSTANT

          PAIN IS INCREASED BY:ACTIVITIES

          PAIN IS DECREASED BY:USE OF PAIN MEDICATIONS

           

      NURSING NOTE:

           -.

           

      PAIN CENTER INTAKE QUESTIONS:

      DO YOU HAVE A HISTORY OF MRSA?

           

           

          :NO

           

      DO YOU TAKE A BLOOD THINNERS?

           

           

          :NO

           

      DO YOU HAVE ANY BLEEDING DISORDERS?

           

           

          :NO

           

      ANY NEW NUMBNESS OR WEAKNESS IN YOUR LEGS OR ARMS?

           

           

          :NO

           

      ANY PACEMAKER,DEFIBRILLATOR, OR DORSAL COLUMN

          STIMULATOR?

           

           

          :NO

           

      DO YOU HAVE ANY RASHES OR OPEN SORES?

           

           

          :NO

           

      ARE YOU ALLERGIC TO IV DYE?

           

           

          :NO

           

      ARE YOU DIABETIC?

           

           

          :NO

           

      ANY NEW PROBLEMS WITH YOUR MEDICATIONS?

           

           

          :NO

           

      HAVE YOU RECEIVED A VACCINE IN THE PAST 30 DAYS?

           

           

          :NO

           

      DO YOU PLAN TO RECEIVE A VACCINE IN THE NEXT 21

          DAYS?

           

           

          :NO

           

      DO YOU TAKE ANY IMMUNOSUPPRESSIVE MEDICATIONS?

           

           

          :NO

           

      ANY HISTORY OF SEIZURES?

           

           

          :NO

           

      ANY HISTORY OF CARDIAC ISSUES OR EVENTS?

           

           

          :YES LEXAPRO CAUSES PROLONGED QT INTERVAL

           

      DO YOU HAVE SLEEP APNEA?

           

           

          :NO

           

      ANY RECENT HEAD INJURY?

           

           

          :NO

           

      DO YOU HAVE ANY NEW INFECTIONS?

           

           

          :NO

           

      IS THERE A CHANCE YOU COULD BE PREGNANT?

           

           

          :NO

           

      ARE YOU BREAST FEEDING?

           

           

          :NO

           

      WHEN DID YOU LAST EAT?

           

           

          : 630

           

      WHEN DID YOU LAST DRINK?

           

           

          : 1050

           

      WHAT DID YOU LAST DRINK?

           

           

          : WATER

           

      NAME OF PERSON DRIVING YOU HOME?

           

           

          : -JEOVANNY

           

      DO YOU HAVE ANY OTHER QUESTIONS OR CONCERNS?

           

           

          : NONE

           

CURRENT MEDICATIONS

           

          TAKING FLONASE ALLERGY RELIEF 50 MCG/ACT SUSPENSION 1 SPRAY IN

          EACH NOSTRIL NASALLY ONCE A DAY

          TAKING MELATONIN 3 MG TABLET AS DIRECTED ORALLY

          TAKING CALCIUM 1000 + D 1000-800 MG-UNIT TABLET 1 TABLET WITH A

          MEAL ORALLY ONCE A DAY

          TAKING WELLBUTRIN  MG TABLET EXTENDED RELEASE 24 HOUR 1

          TABLET IN THE MORNING ORALLY ONCE A DAY, NOTES: 2100

          TAKING AMBIEN 10 MG TABLET 1 TABLET AT BEDTIME AS NEEDED ORALLY

          ONCE A DAY

          TAKING VITAMIN D 1000 UNIT TABLET 1 TABLET ORALLY ONCE A DAY

          TAKING ZOLOFT 25 MG TABLET 1 TABLET ORALLY ONCE A DAY

          TAKING CEFACLOR 250 MG CAPSULE 1 CAPSULE ORALLY EVERY 8 HRS AS

          NEEDED, NOTES: NONE IN 3 MOS

          TAKING GABAPENTIN 600 MG TABLET 1 TABLET ORALLY BID, NOTES: 2100

          NOT-TAKING GABAPENTIN 400 MG CAPSULE 1 CAPSULE ORALLY ONCE A DAY

          NOT-TAKING HYDROXYZINE HCL 25 MG TABLET 1 TABLET AS NEEDED ORALLY

          EVERY 8 HRS, NOTES: NOT LATELY

          NOT-TAKING NORETHINDRONE ACETATE 5 MG TABLET 1 TABLET ORALLY ONCE

          A DAY

          NOT-TAKING ESTRACE 2 MG TABLET 1 TABLET ORALLY ONCE A DAY

          NOT-TAKING NORETHINDRONE ACETATE 5 MG TABLET 1 TABLET ORALLY ONCE

          A DAY

          NOT-TAKING HYDRALAZINE HCL 25 MG TABLET 1 TABLET WITH FOOD ORALLY

          THREE TIMES A DAY

          MEDICATION LIST REVIEWED AND RECONCILED WITH THE PATIENT

           

PAST MEDICAL HISTORY

           

          UTIS

          KIDNEY CYST

          CHRONIC BACK PAIN

          MIGRAINE

          PREMATURE OVARIAN FAILURE

          OSTEOPOROSIS

          DEGENERATIVE DISK DISEASE

          ALPORTS SYNDROME

           

ALLERGIES

           

          VICODIN: N/V, RASH - ALLERGY

          CAT ALLERGY: CONGESTION - ALLERGY

          LEXAPRO: PROLONGED QT - SIDE EFFECTS

           

SOCIAL HISTORY

           

          GENERAL:

           

          TOBACCO USE

          ARE YOU A:NONSMOKER

           

           

          LATEX QUESTIONNAIRE

          LATEX ALLERGY : HAVE YOU EVER DEVELOPED ANY TYPE OF REACTION

          AFTER HANDLING LATEX PRODUCTS SUCH AS RUBBER GLOVES, CONDOMS,

          DIAPHRAGMS, BALLOONS, SOCKS, OR UNDERWEAR?NO

          LATEX ALLERGY : HAVE YOU EVER DEVELOPED ANY TYPE OF REACTION

          DURING OR AFTER DENTAL APPOINTMENT, VAGINAL/RECTAL EXAMINATION,

          SURGICAL PROCEDURE, OR ANY OTHER EXPOSURE?NO

          DATE ASKED : 2021

          LATEX RISK : HAVE YOU EVER HAD ANY DIFFICULTY BREATHING OR HIVES

          AFTER EATING OR HANDLING ANY FRUITS, OR VEGETABLES; SUCH AS KIWI,

          BANANAS, STONE FRUITS, OR CHESTNUTSNO

          LATEX RISK : DO YOU HAVE A PREVIOUS PERSONAL HISTORY OF MORE THAN

          NINE SURGERIES, SPINA BIFIDA, OR REPEATED CATHERIZATIONS? NO

          LATEX RISK : ARE YOU FREQUENTLY EXPOSED TO LATEX PRODUCTS IN YOUR

          OCCUPATION?NO

           

           

          ALCOHOL USE: NO.

           

           

          ALCOHOL SCREENING

          DID YOU HAVE A DRINK CONTAINING ALCOHOL IN THE PAST YEAR?NO

          POINTS0

          INTERPRETATIONNEGATIVE

           

           

          RECREATIONAL DRUG USE

          DRUG USE?NO

           

           

          CAFFEINE

          CAFFEINE USE?YES 2 CUPS DAY

           

           

          Episcopalian

          [id number] Worship NO Zoroastrian BELIEFS THAT WOULD IMPACT

          HEALTH CARE.

           

           

          LANGUAGE

          LANGUAGES SPOKEN:ENGLISH

           

           

          LEARNING BARRIERS / SPECIAL NEEDS

          BARRIERS TO LEARNING?NO

          HEARING IMPAIRED?NO

          VISION IMPAIRED?YES

          COGNITIVELY IMPAIRED?NO

          :CORRECTIVE LENSES

          READINESS TO LEARN?YES

          LEARNING PREFERENCES?NO

          LEARNING CAPABILITIES PRESENT?YES

          EMOTIONAL BARRIERS?NO

          SPECIAL DEVICES?NO

           NEEDED?NO

           

           

          DOMESTIC VIOLENCE

          STATUS:

          DO YOU FEEL SAFE IN YOUR ENVIRONMENT?YES

           

           

          OCCUPATION: SELF EMPLOYED.

           

           

          DIET: REGULAR.

           

           

          EXERCISE: DAILY.

           

           

          MARITAL STATUS: .

           

           

          -

          HAS THE PATIENT BEEN EDUCATED REGARDING HIS/HER PLAN OF CARE?YES

          HAS THE PATIENT BEEN EDUCATED REGARDING PAIN, THE RISK FOR PAIN,

          THE IMPORTANCE OF EFFECTIVE PAIN MANAGEMENT, AND THE PAIN

          ASSESSMENT PROCESS?YES

           

           

          HOUSING: RENTS APARTMENT.

           

           

          ADVANCE DIRECTIVE

          ADVANCE DIRECTIVE DISCUSSED WITH PATIENT:YES PT DOES NOT HAVE ANY

          ADVANCED DIRECTIVES AND SHE DECLINED INFORMATION ON HCP AT THIS

          TIME

           

VITAL SIGNS

           

          .4 LBS, HT 70 IN, BMI 25.02 INDEX, /72 MM HG, HR 68

          /MIN, RR 18 /MIN, TEMP 96.9 F, OXYGEN SAT % 96%, SAFE IN ENV?

          (Y/N) Y, NA INITIALS AW 1304, REVIEWED BY: VICENTA GRANADOS RN.

           

EXAMINATION

           

      GENERAL EXAMINATION: THE PATIENT IS ALERT, ORIENTED

          TIMES THREE AND COOPERATIVE. LUNGS ARE CLEAR TO AUSCULTATION.

          HEART SHOWS REGULAR RHYTHM, NO MURMURS AND NO GALLOPS.

           

ASSESSMENTS

           

          SPONDYLOSIS WITHOUT MYELOPATHY OR RADICULOPATHY, LUMBAR REGION -

          M47.816 (PRIMARY)

           

          SPONDYLOSIS WITHOUT MYELOPATHY OR RADICULOPATHY, LUMBOSACRAL

          REGION - M47.817

           

TREATMENT

           

      SPONDYLOSIS WITHOUT MYELOPATHY OR RADICULOPATHY,

          LUMBAR REGION

          [facility] FACET BLOCK (PAIN)[id number]

          MEDICATION: VALIUM TAB 10MG ORALLY (DIAZEPAM)CA GOOD 2021

          1:27:30 PM > VERIFIED SAROJ GRANADOS 2021 1:32:04 PM >

          ADMINISTERED

          MEDICATION: OXYCODONE HCL TAB 10MG ORALLYCA GOOD 2021

          1:27:58 PM > VERIFIED SAROJ GRANADOS 2021 1:32:32 PM >

          ADMINISTERED

          COMPLETION OF PROCEDURAL VISIT WHEN MEETS CRITERIA

           

           

      OTHERS

          NOTES: PATG ENMA QUESADA RN BSN.

           

PROCEDURES

           

      PAIN NURSING RECORD

          PROCEDURE    IN ROOM 1400, PHYSICIAN IN ROOM 1423, START 1429,

          FINISH 1458, PHYSICIAN OUT OF ROOM 1459, OUT OF ROOM 1506, ECG

          NORMAL SINUS, PATIENT SHIELDED YES, SAFETY STRAP NO, PREP

          CHLOROPREP BY COBY GAITAN RN, DRESSING TEGADERM BY DR. THOMPSON

          LOC:    SAROJ GRANADOS 2021 1:37:49 PM > , 1. ALERT, ORIENTED

          DARWINSAROJ 2021 3:19:06 PM > 1. ALERT, ORIENTED

          RESP:    KVNG GRANADOSITA 2021 1:37:59 PM > , 1. REGULAR, NO

          DYSPNEA DARWINSAROJ 2021 3:19:14 PM > 1. REGULAR, NO DYSPNEA

          COLOR:    KVNG GRANADOSITA 2021 1:38:02 PM > , 1. PINK

          DARWINSAROJ 2021 3:19:18 PM > 1. PINK

          SKIN:    DARWINSAROJ 2021 1:38:07 PM > , 1. WARM, DRY

          DARWINSAROJ 2021 3:19:32 PM > 1. WARM, DRY

          POSITION:    KVNG GRANADOSITA 2021 1:38:15 PM > , 5. SITTING

          DARWINSAROJ 2021 2:03:53 PM > 1. PRONE DARWINSAROJ 2021

          3:19:42 PM > 5. SITTING

          VITALS:    SAROJ GRANADOS 2021 2:02:01 PM > 133/73/,80,16,97%

          DARWIN,SAROJ 2021 2:27:50 PM > 128/73,65,16,98% DARWIN,SAROJ

          2021 2:29:50 PM > 129/77,69,18,99% DARWIN,SAROJ 2021

          2:44:38 PM > 126/70,68,16,99% DARWIN,SAROJ 2021 2:59:41 PM >

          132/61,82,16,97% DARWIN,SAROJ 2021 3:20:00 PM >

          119/78,77,16,99%

          NOTES    DARWINSAROJ 2021 3:21:05 PM > GROUNDING PAD SITE

          RIGHT POSTERIOR THIGH IS CLEAR WITHOUT REDNESS OR SWELLING.

          COMPLETION OF PROCEDURE APPOINTMENT:    POST PAIN 6, DRESSING

          SITE DRY AND INTACT, IV N/A, GAIT STEADY, TEACHING COMPLETED,

          PATIENT ACKNOWLEDGES UNDERSTANDING YES PRINTED POST-PROCEDURE

          INSTRUCTIONS AND COVID SYMPTOM MONITORING INSTRUCTIONS GIVEN TO

          AND REVIEWED WITH PT AND SHE VERBALIZED UNDERSTANDNIG, PROCEDURE

          APPOINTMENT COMPLETED AT 1522

      PN RADIOFREQUENCY

          DATE OF PROCEDURE    2021 .

          THERMO LESION RADIOFREQUENCY > 80 DEGREES    : COOL - AVANOS

          SYSTEM SET AT 60* WITH TISSUE TARGET TEMP > 80* OR MORE. STRAIGHT

          NEEDLE .

          SIDE:    : RIGHT .

          LEVELS:    : L4-L5, L5-S1.

          NEEDLE/CATHETER/GAUGE:    : 17 .

          CANULA LENGTH:    : 100 MM .

          ACTIVE TIP:    : 4 MM .

          GROUNDING PAD PLACED ON AFFECTED SIDE (MUSCULAR AREA):    :

          LUMBAR (POSTERIOR UPPER THIGH) RIGHT.

          1 ST LEVEL:    : L3,INITAL POSTIVE SENSORY RESPONE (50 HZ)

          0.2,MOTOR RESPONSE (2 HZ-UP TO 3 VOLTS) 3.0 ,PRE-LOCAL IMPEDENCE

          READING OHMS 415 ,POST-LOCAL IMPEDENCE READING OHMS 254 ,DURING

          RF IMPEDENCE READING OHMS 254 ,

          2 ND LEVEL:    : L4,INITIAL POSITIVE SENSORY RESPONSE (50 HZ)

          0.4,MOTOR RESPONSE (2 HZ- UP TO 3 VOLTS) 3.0 ,PRE-LOCAL IMPEDENCE

          READING OHMS 243 ,POST-LOCAL IMEPEDENCE READING OHMS 227 ,DURING

          RF IMPEDENCE READING OHMS 196 ,

          3 RD LEVEL:    : L5,INITIAL POSITIVE SENSORY RESPONSE (50 HZ)

          0.2,MOTOR RESPONSE (2HZ- UP TO 3 VOLTS) 3.0 ,PRE- LOCAL IMPEDENCE

          READING OHMS 245 ,POST-LOCAL IMPEDENCE READING OHMS 222 ,DURING

          RF IMPEDENCE READING OHMS 278 ,

          PRE PROCEDURE DIAGNOSES    1. LUMBAR SPONDYLOSIS. 2. LUMBOSACRAL

          SPONDYLOSIS

          POST PROCEDURE DIAGNOSES    1. LUMBAR SPONDYLOSIS. 2. LUMBOSACRAL

          SPONDYLOSIS

          PROCEDURE    RIGHT L4-L5 AND RIGHT L5-S1 LUMBAR FACET

          RADIOFREQUENCY

          SURGEON    DR. DIANA THOMPSON

          ASSISTANT    NONE

          ANESTHESIA    LOCAL

          PRE PROCEDURE REPORT    THE PATIENT HAS HISTORY OF CHRONIC LOW

          BACK PAIN. I EVALUATED THE PATIENT AND REVIEWED THE CHART. I WENT

          OVER THE RISKS, ALTERNATIVES, AND BENEFITS ASSOCIATED WITH THIS

          PROCEDURE. THE PATIENT WOULD LIKE TO PROCEED AND GAVE CONSENT TO

          PERFORM THE PROCEDURE. THE PATIENT DENIES UNEXPLAINABLE WEIGHT

          LOSS, FEVER, CHILLS OR NEW CHANGES IN URINARY OR BOWEL CONTROL.

          THE PATIENT IS COVID-19 NEGATIVE

          DESCRIPTION OF PROCEDURE    THE PATIENT WAS BROUGHT TO THE

          PROCEDURE ROOM AND PLACED IN THE PRONE POSITION. A TIMEOUT WAS

          PERFORMED WHERE THE CONSENTED SITE WAS VERIFIED WITH EVERYONE IN

          THE ROOM. THE LUMBOSACRAL AREA WAS CLEANED WITH CHLORAPREP

          SOLUTION AND DRAPED ASEPTICALLY. THE PROCEDURE WAS DONE UNDER

          STERILE CONDITIONS. UNDER FLUOROSCOPIC GUIDANCE, TARGETS WERE

          SELECTED AT THE INTERSECTION OF THE RIGHT TRANSVERSE PROCESS OF

          L4, L5 AND ALA OF S1 WITH ITS RESPECTIVE SUPERIOR ARTICULAR

          PROCESS. I CONFIRMED AGAIN THE SITE OF THE TARGET. LIDOCAINE WAS

          USED TO NUMB THE SKIN AND THE SUBCUTANEOUS TISSUE BELOW IT.

          RADIOFREQUENCY CANNULAS, 17-GAUGE, 100 MM LONG WITH 4 MM ACTIVE

          TIP, WERE ADVANCED UNDER FLUOROSCOPIC GUIDANCE AND FOLLOWING

          PATIENT FEEDBACK UNTIL THE TARGET AREA WAS REACHED. POSITION OF

          THE CANNULA WAS VERIFIED WITH AP AND LATERAL VIEWS. AFTER PROPER

          POSITION OF THE CANNULA WAS ACHIEVED, WE WORKED WITH THE RIGHT

          SELECTED MEDIAN BRANCHES OF L3, L4 AND THE DORSAL RAMI OF L5. WE

          MEASURED THE CORRESPONDING IMPEDANCES AND MOTOR RESPONSES AS

          INDICATED IN THE RADIOFREQUENCY WORK SHEET. POSITION OF THE

          CANNULA WAS VERIFIED AGAIN WITH AP AND LATERAL VIEWS. LIDOCAINE

          1%, 2 ML, WAS INJECTED AT EACH LEVEL. RADIOFREQUENCY WAS DONE AT

          EACH LEVEL USING THE Chloe + Isabel SYSTEM-- COOLED RF-- WITH A SETTING

          AT THE MACHINE OF 60 DEGREES WITH A TARGET TISSUE TEMPERATURE OF

          80 TO 90 DEGREES FOR A MINIMUM  SECONDS. AFTER

          RADIOFREQUENCY WAS DONE, THE PATIENT RECEIVED BUPIVACAINE

          0.125%,1 ML, WITH DEXAMETHASOME 3 MG AT EACH SITE. THERE WAS NO

          EVIDENCE OF BLOOD, PARESTHESIA OR CEREBROSPINAL FLUID DURING THE

          PROCEDURE. THE PATIENT WAS SENT TO THE RECOVERY ROOMS. THE

          PATIENT WAS MOVING THE EXTREMITIES AND DOING WELL. EBL LESS THAN

          5 ML. THERE WERE NO COMPLICATIONS DURING THE PROCEDURE.

          FLUOROSCOPY TIME WAS 1 MINUTE 5 SECONDS

          POST PROCEDURE NOTE    THE PATIENT WILL BE SEEN IN A FOLLOW UP IN

          THE NEXT FEW WEEKS. INSTRUCTIONS WERE GIVEN, QUESTIONS WERE

          ANSWERED, AND THE PATIENT EXPRESSED UNDERSTANDING AND AGREES WITH

          THE PLAN. I, YASH COTA, DOCUMENTED THE ABOVE INFORMATION

          ACTING AS A SCRIBE FOR DR. THOMPSON. I HAVE REVIEWED THE ABOVE

          DOCUMENT, WRITTEN BY BETH PAN, AND I VERIFY

          THAT IT IS ACCURATE

           

PROCEDURE CODES

           

          68077 DESTROY LUMB/SAC FACET JNT, MODIFIERS: RT

           

          95088 DESTROY L/S FACET JNT ADDL, MODIFIERS: RT

           

DISPOSITION & COMMUNICATION

           

FOLLOW UP

           

          FOLLOW UP WITH NP (REASON: POST RIGHT LUMBAR COOL RADIOFREQUENCY

          L4-L5, L5-S1)

           

 

ELECTRONICALLY SIGNED BY DIANA THOMPSON MD, MD ON

          02/10/2021 AT 11:11 AM EST

           

           

           

 

DISCLAIMER :

THIS IS A VISIT SUMMARY EXTRACTED FROM THE [employer] CHART.

IT IS NOT A COPY OF THE [employer] PROGRESS NOTE.

AWILDAD

## 2021-02-23 ENCOUNTER — HOSPITAL ENCOUNTER (OUTPATIENT)
Dept: HOSPITAL 53 - M PAIN | Age: 31
End: 2021-02-23
Attending: NURSE PRACTITIONER
Payer: COMMERCIAL

## 2021-02-23 DIAGNOSIS — G89.29: ICD-10-CM

## 2021-02-23 DIAGNOSIS — G43.909: ICD-10-CM

## 2021-02-23 DIAGNOSIS — Z88.8: ICD-10-CM

## 2021-02-23 DIAGNOSIS — M47.816: Primary | ICD-10-CM

## 2021-02-23 DIAGNOSIS — Z79.899: ICD-10-CM

## 2021-02-23 DIAGNOSIS — Z88.5: ICD-10-CM

## 2021-02-28 NOTE — ECWPNPC
PATIENT NAME: COBY SHIELDS

: 1990

GENDER: FEMALE

MRN: [id number]

VISIT DATE: 2021

DISCHARGE DATE: 21 1031

VISIT LOCKED DATE TIME: 

PHYSICIAN: MANJULA BARRON

RESOURCE: MANJULA BARRON

 

           

           

REASON FOR APPOINTMENT

           

          1. POST RIGHT COOL RADIOFREQUENCY L4-5, L5-S1

           

HISTORY OF PRESENT ILLNESS

           

      GENERAL:  HERE FOR POST PROCEDURE F/U.HAD RIGHT

          L4/5-L5/S1 COOL RF ON 2021.REPORTING INITIAL AGGREVATION IN

          PAIN X1 WEEK POST PROCEDURE BUT NOW IS EXPERIENCING

          IMPROVEMENT.RATING LOW BACK PAIN 4/10.REPORTING IMPROVEMENT IN

          ABILITY TO TOLERATE ACTIVITIES IE:PLAYING WITH CHILDREN AND DOG

          SINCE PROCEDURE.WE DISCUSSED DOING #2 LFBDX LEFT ONCE SHE HAS

          RECOVERED.

           -.

           

      FALL RISK SCREENING:

      SCREENING

           

           

          :NO FALLS REPORTED IN THE LAST YEAR

           

      PAIN SCREENING:

      PATIENT HAS A COMPLAINT OF ACUTE OR CHRONIC PAIN

           

           

          :YES

          LOCATION OF PAIN:LOW BACK

          INTENSITY OF PAIN (SCALE OF 1 TO 10):4

          WHAT DOES YOUR PAIN FEEL LIKE:ACHING, TENDER, THROBBING, SORE

          DURATION:CONTINOUS, CONSTANT, ALL DAY

          PAIN IS INCREASED BY:ACTIVITIES

          PAIN IS DECREASED BY:OTHERS HEAT

           

      NURSING NOTE:

           -.

           

      PAIN CENTER INTAKE QUESTIONS:

      DO YOU HAVE A HISTORY OF MRSA?

           

           

          :NO

           

      DO YOU TAKE A BLOOD THINNERS?

           

           

          :NO

           

      DO YOU HAVE ANY BLEEDING DISORDERS?

           

           

          :NO

           

      ANY NEW NUMBNESS OR WEAKNESS IN YOUR LEGS OR ARMS?

           

           

          :NO

           

      ANY PACEMAKER,DEFIBRILLATOR, OR DORSAL COLUMN

          STIMULATOR?

           

           

          :NO

           

      DO YOU HAVE ANY RASHES OR OPEN SORES?

           

           

          :NO

           

      ARE YOU ALLERGIC TO IV DYE?

           

           

          :NO

           

      ARE YOU DIABETIC?

           

           

          :NO

           

      ANY NEW PROBLEMS WITH YOUR MEDICATIONS?

           

           

          :NO

           

      HAVE YOU RECEIVED A VACCINE IN THE PAST 30 DAYS?

           

           

          :NO

           

      DO YOU PLAN TO RECEIVE A VACCINE IN THE NEXT 21

          DAYS?

           

           

          :NO

           

      DO YOU NEED ANY PRESCRIPTION?

           

           

          :NO

           

      DO YOU TAKE ANY IMMUNOSUPPRESSIVE MEDICATIONS?

           

           

          :NO

           

      IS THERE A CHANCE YOU COULD BE PREGNANT?

           

           

          :NO

           

      ARE YOU BREAST FEEDING?

           

           

          :NO

           

CURRENT MEDICATIONS

           

          TAKING FLONASE ALLERGY RELIEF 50 MCG/ACT SUSPENSION 1 SPRAY IN

          EACH NOSTRIL NASALLY ONCE A DAY

          TAKING MELATONIN 3 MG TABLET AS DIRECTED ORALLY

          TAKING CALCIUM 1000 + D 1000-800 MG-UNIT TABLET 1 TABLET WITH A

          MEAL ORALLY ONCE A DAY

          TAKING WELLBUTRIN  MG TABLET EXTENDED RELEASE 24 HOUR 1

          TABLET IN THE MORNING ORALLY ONCE A DAY

          TAKING AMBIEN 10 MG TABLET 1 TABLET AT BEDTIME AS NEEDED ORALLY

          ONCE A DAY

          TAKING VITAMIN D 1000 UNIT TABLET 1 TABLET ORALLY ONCE A DAY

          TAKING ZOLOFT 25 MG TABLET 1 TABLET ORALLY ONCE A DAY

          TAKING CEFACLOR 250 MG CAPSULE 1 CAPSULE ORALLY EVERY 8 HRS AS

          NEEDED

          TAKING GABAPENTIN 600 MG TABLET 1 TABLET ORALLY BID

          NOT-TAKING GABAPENTIN 400 MG CAPSULE 1 CAPSULE ORALLY ONCE A DAY

          NOT-TAKING HYDROXYZINE HCL 25 MG TABLET 1 TABLET AS NEEDED ORALLY

          EVERY 8 HRS, NOTES: NOT LATELY

          NOT-TAKING NORETHINDRONE ACETATE 5 MG TABLET 1 TABLET ORALLY ONCE

          A DAY

          NOT-TAKING ESTRACE 2 MG TABLET 1 TABLET ORALLY ONCE A DAY

          NOT-TAKING NORETHINDRONE ACETATE 5 MG TABLET 1 TABLET ORALLY ONCE

          A DAY

          NOT-TAKING HYDRALAZINE HCL 25 MG TABLET 1 TABLET WITH FOOD ORALLY

          THREE TIMES A DAY

          MEDICATION LIST REVIEWED AND RECONCILED WITH THE PATIENT

           

PAST MEDICAL HISTORY

           

          UTIS

          KIDNEY CYST

          CHRONIC BACK PAIN

          MIGRAINE

          PREMATURE OVARIAN FAILURE

          OSTEOPOROSIS

          DEGENERATIVE DISK DISEASE

          ALPORTS SYNDROME

           

ALLERGIES

           

          VICODIN: N/V, RASH - ALLERGY

          CAT ALLERGY: CONGESTION - ALLERGY

          LEXAPRO: PROLONGED QT - SIDE EFFECTS

           

SOCIAL HISTORY

           

          GENERAL:

           

          TOBACCO USE

          ARE YOU A:NONSMOKER

           

           

          LATEX QUESTIONNAIRE

          LATEX ALLERGY : HAVE YOU EVER DEVELOPED ANY TYPE OF REACTION

          AFTER HANDLING LATEX PRODUCTS SUCH AS RUBBER GLOVES, CONDOMS,

          DIAPHRAGMS, BALLOONS, SOCKS, OR UNDERWEAR?NO

          LATEX ALLERGY : HAVE YOU EVER DEVELOPED ANY TYPE OF REACTION

          DURING OR AFTER DENTAL APPOINTMENT, VAGINAL/RECTAL EXAMINATION,

          SURGICAL PROCEDURE, OR ANY OTHER EXPOSURE?NO

          LATEX RISK : HAVE YOU EVER HAD ANY DIFFICULTY BREATHING OR HIVES

          AFTER EATING OR HANDLING ANY FRUITS, OR VEGETABLES; SUCH AS KIWI,

          BANANAS, STONE FRUITS, OR CHESTNUTSNO

          LATEX RISK : DO YOU HAVE A PREVIOUS PERSONAL HISTORY OF MORE THAN

          NINE SURGERIES, SPINA BIFIDA, OR REPEATED CATHERIZATIONS? NO

          LATEX RISK : ARE YOU FREQUENTLY EXPOSED TO LATEX PRODUCTS IN YOUR

          OCCUPATION?NO

          DATE ASKED : 2021

           

           

          ALCOHOL USE: NO.

           

           

          ALCOHOL SCREENING

          DID YOU HAVE A DRINK CONTAINING ALCOHOL IN THE PAST YEAR?NO

          POINTS0

          INTERPRETATIONNEGATIVE

           

           

          RECREATIONAL DRUG USE

          DRUG USE?NO

           

           

          CAFFEINE

          CAFFEINE USE?YES 2 CUPS DAY

           

           

          Advent

          [id number] Jainism NO Baptism BELIEFS THAT WOULD IMPACT

          HEALTH CARE.

           

           

          LANGUAGE

          LANGUAGES SPOKEN:ENGLISH

           

           

          LEARNING BARRIERS / SPECIAL NEEDS

          CHANGE FROM LAST VISIT?NO

          BARRIERS TO LEARNING?NO

          HEARING IMPAIRED?NO

          VISION IMPAIRED?YES

          :CORRECTIVE LENSES

          COGNITIVELY IMPAIRED?NO

          READINESS TO LEARN?YES

          LEARNING PREFERENCES?NO

          LEARNING CAPABILITIES PRESENT?YES

          EMOTIONAL BARRIERS?NO

          SPECIAL DEVICES?NO

           NEEDED?NO

           

           

          DOMESTIC VIOLENCE

          STATUS:

          DO YOU FEEL SAFE IN YOUR ENVIRONMENT?YES

           

           

          OCCUPATION: SELF EMPLOYED.

           

           

          DIET: REGULAR.

           

           

          EXERCISE: DAILY.

           

           

          MARITAL STATUS: .

           

           

          -

          HAS THE PATIENT BEEN EDUCATED REGARDING HIS/HER PLAN OF CARE?YES

          HAS THE PATIENT BEEN EDUCATED REGARDING PAIN, THE RISK FOR PAIN,

          THE IMPORTANCE OF EFFECTIVE PAIN MANAGEMENT, AND THE PAIN

          ASSESSMENT PROCESS?YES

           

           

          HOUSING: RENTS APARTMENT.

           

           

          ADVANCE DIRECTIVE

          ADVANCE DIRECTIVE DISCUSSED WITH PATIENT:YES PT DOES NOT HAVE ANY

          ADVANCED DIRECTIVES AND SHE DECLINED INFORMATION ON HCP AT THIS

          TIME

           

REVIEW OF SYSTEMS

           

      CONSTITUTIONAL:

           

          ANY RECENT FEVER    NO . CHILLS    NO . WEIGHT CHANGE OF UNKNOWN

          REASONS    NO .

           

      GASTROENTEROLOGY:

           

          NEW UNEXPLAINABLE CHANGES IN BOWEL CONTROL    NO . CONSTIPATION  

           NO .

           

      GENITOURINARY:

           

          ANY NEW CHANGE IN BLADDER CONTROL?    NO .

           

      NEUROLOGY:

           

          NEW ONSET DIZZINESS OR NEUROLOGICAL CHANGES NOT MENTIONED    NO .

          NEW NUMBNESS OR PAIN PATTERNS NOT MENTIONED AND PERTINENT TO

          TODAY'S VISIT    NO .

           

      CARDIOLOGY:

           

          NEW CHEST PRESSURE    NO . PATIENT DENIES    NO .

           

      RESPIRATORY:

           

          UNEXPLAINABLE COUGH    NO . NEW SHORTNESS OF BREATH    NO .

           

VITAL SIGNS

           

          .6 LBS, HT 70 IN, BMI 25.62 INDEX, /61 MM HG, HR 81

          /MIN, RR 18 /MIN, TEMP 96.5 F, OXYGEN SAT % 96%, SAFE IN ENV?

          (Y/N) YES, NA INITIALS SC 09:56T.SONAM GREER.

           

EXAMINATION

           

      GENERAL EXAMINATION:

          GENERALAWAKE,ALERT ,PLEASANT .

           

          PSYCHAFFECT NORMAL .

           

          LUNGS:LUNG POOLE ARE CLEAR TO AUSCULTATION BILATERALLY. GOOD

          MOVEMENT OF AIR .

           

          HEART:S1, S2 IN A REGULAR RATE AND RHYTHM. NO SIGNIFICANT

          MURMURS, RUBS OR GALLOPS NOTED .

           

ASSESSMENTS

           

          OTHER CHRONIC PAIN - G89.29 (PRIMARY)

           

          SPONDYLOSIS WITHOUT MYELOPATHY OR RADICULOPATHY, LUMBAR REGION -

          M47.816

           

TREATMENT

           

      OTHER CHRONIC PAIN

          PAIN PROCEDURE LOGDATE OF [id number]/09/2021PROCEDURE:RIGHT COOL

          RADIOFREQUENCY L4-L5, L5-C6HNWAWL OF PRE SEDATEVALIUM 10 MG;

          OXYCODONE 10 MGRESULT:REPORTING LESS PAIN AND IMPROVED ACTIVITY

          TOLERANCE

          NOTES: CONTINUE HOME STRETCHING AND WALKING.

           

PROCEDURE CODES

           

           ESTABILISHED PATIENT [facility] FACILITY CHARGE

           

DISPOSITION & COMMUNICATION

           

FOLLOW UP

           

          6 WEEKS (REASON: CONSIDER LFBDX #2 ON LEFT)

           

 

ELECTRONICALLY SIGNED BY MISAEL GIVENS ON

          2021 AT 01:52 PM EST

           

           

           

 

DISCLAIMER :

THIS IS A VISIT SUMMARY EXTRACTED FROM THE [employer] CHART.

IT IS NOT A COPY OF THE [employer] PROGRESS NOTE.

FADY

## 2021-03-09 ENCOUNTER — HOSPITAL ENCOUNTER (OUTPATIENT)
Dept: HOSPITAL 53 - M RAD | Age: 31
End: 2021-03-09
Attending: PHYSICIAN ASSISTANT
Payer: COMMERCIAL

## 2021-03-09 DIAGNOSIS — S44.02XA: Primary | ICD-10-CM
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
## 2021-03-09 NOTE — REP
INDICATION:

INJURY OF ULNAR NERVE AT UPPER ARM LEVEL, LEFT



COMPARISON:

None.



TECHNIQUE:

AP and lateral left elbow.



FINDINGS:

There is no evidence of acute fracture, dislocation, or intrinsic bone disease.The

joint spaces appear normal.  There is no joint effusion.



IMPRESSION:

Negative left elbow series.





<Electronically signed by Roberto Carlos Hood > 03/09/21 [id number]

## 2021-03-11 ENCOUNTER — HOSPITAL ENCOUNTER (OUTPATIENT)
Dept: HOSPITAL 53 - M LAB REF | Age: 31
End: 2021-03-11
Attending: NURSE PRACTITIONER
Payer: COMMERCIAL

## 2021-03-11 DIAGNOSIS — R80.9: Primary | ICD-10-CM

## 2021-04-06 ENCOUNTER — HOSPITAL ENCOUNTER (OUTPATIENT)
Dept: HOSPITAL 53 - M PAIN | Age: 31
End: 2021-04-06
Attending: NURSE PRACTITIONER
Payer: COMMERCIAL

## 2021-04-06 DIAGNOSIS — M47.816: Primary | ICD-10-CM

## 2021-04-06 DIAGNOSIS — Z88.8: ICD-10-CM

## 2021-04-06 DIAGNOSIS — Z79.899: ICD-10-CM

## 2021-04-06 DIAGNOSIS — G43.909: ICD-10-CM

## 2021-04-06 DIAGNOSIS — Z88.5: ICD-10-CM

## 2021-04-11 ENCOUNTER — HOSPITAL ENCOUNTER (OUTPATIENT)
Dept: HOSPITAL 53 - M LABSMTC | Age: 31
End: 2021-04-11
Attending: ANESTHESIOLOGY
Payer: COMMERCIAL

## 2021-04-11 DIAGNOSIS — Z11.59: ICD-10-CM

## 2021-04-11 DIAGNOSIS — Z20.828: Primary | ICD-10-CM

## 2021-04-15 ENCOUNTER — HOSPITAL ENCOUNTER (OUTPATIENT)
Dept: HOSPITAL 53 - M PAIN | Age: 31
End: 2021-04-15
Attending: ANESTHESIOLOGY
Payer: COMMERCIAL

## 2021-04-15 DIAGNOSIS — G43.909: ICD-10-CM

## 2021-04-15 DIAGNOSIS — Z88.8: ICD-10-CM

## 2021-04-15 DIAGNOSIS — M47.816: Primary | ICD-10-CM

## 2021-04-15 DIAGNOSIS — Z79.899: ICD-10-CM

## 2021-04-15 DIAGNOSIS — Z88.5: ICD-10-CM

## 2021-04-15 DIAGNOSIS — M47.817: ICD-10-CM

## 2021-04-15 PROCEDURE — 64494 INJ PARAVERT F JNT L/S 2 LEV: CPT

## 2021-04-15 PROCEDURE — 64493 INJ PARAVERT F JNT L/S 1 LEV: CPT

## 2021-04-15 NOTE — REP
INDICATION:

LEFT DIAGNOSTIC LUMBAR FACET L4-5, L5-S1.



COMPARISON:

None.



TECHNIQUE:

Three views.  19.1 seconds of fluoroscopy time is reported.



FINDINGS:

A sequence of 3 last image hold fluoroscopically obtained spot radiograph(s) of the

lumbar spine document(s) needle position(s) and contrast injection associated with

injection procedure.



IMPRESSION:

Procedural imaging.





<Electronically signed by Maximus Davis > 04/15/21 [id number]

## 2021-04-16 NOTE — ECWPNPC
PATIENT NAME: COBY SHIELDS

: 1990

GENDER: FEMALE

MRN: [id number]

VISIT DATE: 04/15/2021

DISCHARGE DATE: 04/15/21 1455

VISIT LOCKED DATE TIME: 

PHYSICIAN: DIANA THOMPSON MD

RESOURCE: DIANA THOMPSON MD

 

           

           

REASON FOR APPOINTMENT

           

          1. LEFT DIAGNOSTIC LUMBAR FACET BLOCK L4-L5,L5-S1

           

HISTORY OF PRESENT ILLNESS

           

      GENERAL:

           -.

           

      FALL RISK SCREENING:

      SCREENING

          : NO FALLS REPORTED IN THE LAST YEAR.

           

      PAIN SCREENING:

      PATIENT HAS A COMPLAINT OF ACUTE OR CHRONIC PAIN

           

           

          :YES

          LOCATION OF PAIN:LOW BACK, LEFT HIP, RIGHT HIP

          INTENSITY OF PAIN (SCALE OF 1 TO 10):8

          WHAT DOES YOUR PAIN FEEL LIKE:ACHING, THROBBING

          DURATION:CONTINOUS, CONSTANT

          PAIN IS INCREASED BY:ACTIVITIES

          PAIN IS DECREASED BY: HEAT

           

      NURSING NOTE:

           -.

           

      PAIN CENTER INTAKE QUESTIONS:

      DO YOU HAVE A HISTORY OF MRSA?

           

           

          :NO

           

      DO YOU TAKE A BLOOD THINNERS?

           

           

          :NO

           

      DO YOU HAVE ANY BLEEDING DISORDERS?

           

           

          :NO

           

      ANY NEW NUMBNESS OR WEAKNESS IN YOUR LEGS OR ARMS?

           

           

          :NO

           

      ANY PACEMAKER,DEFIBRILLATOR, OR DORSAL COLUMN

          STIMULATOR?

           

           

          :NO

           

      DO YOU HAVE ANY RASHES OR OPEN SORES?

           

           

          :NO

           

      ARE YOU ALLERGIC TO IV DYE?

           

           

          :NO

           

      ARE YOU DIABETIC?

           

           

          :NO

           

      ANY NEW PROBLEMS WITH YOUR MEDICATIONS?

           

           

          :NO

           

      HAVE YOU RECEIVED A VACCINE IN THE PAST 30 DAYS?

           

           

          :NO

           

      DO YOU PLAN TO RECEIVE A VACCINE IN THE NEXT 21

          DAYS?

           

           

          :NO

           

      DO YOU TAKE ANY IMMUNOSUPPRESSIVE MEDICATIONS?

           

           

          :NO

           

      ANY HISTORY OF SEIZURES?

           

           

          :NO

           

      ANY HISTORY OF CARDIAC ISSUES OR EVENTS?

           

           

          :NO

           

      DO YOU HAVE ANY KIDNEY OR LIVER DISEASE?

           

           

          :YES ALPORDS CONDITION

           

      DO YOU HAVE SLEEP APNEA?

           

           

          :NO

           

      ANY RECENT HEAD INJURY?

           

           

          :NO

           

      DO YOU HAVE ANY NEW INFECTIONS?

           

           

          :NO

           

      IS THERE A CHANCE YOU COULD BE PREGNANT?

           

           

          :NO

           

      ARE YOU BREAST FEEDING?

           

           

          :NO

           

      WHEN DID YOU LAST EAT?

           

           

          : 4/15 0730

           

      WHEN DID YOU LAST DRINK?

           

           

          : 4/15 1100

           

      WHAT DID YOU LAST DRINK?

           

           

          : WATER

           

      NAME OF PERSON DRIVING YOU HOME?

           

           

          : [employer]

           

      DO YOU HAVE ANY OTHER QUESTIONS OR CONCERNS?

           

           

          : NONE

           

CURRENT MEDICATIONS

           

          TAKING FLONASE ALLERGY RELIEF 50 MCG/ACT SUSPENSION 1 SPRAY IN

          EACH NOSTRIL NASALLY ONCE A DAY

          TAKING MELATONIN 3 MG TABLET AS DIRECTED ORALLY

          TAKING CALCIUM 1000 + D 1000-800 MG-UNIT TABLET 1 TABLET WITH A

          MEAL ORALLY ONCE A DAY

          TAKING WELLBUTRIN  MG TABLET EXTENDED RELEASE 24 HOUR 1

          TABLET IN THE MORNING ORALLY ONCE A DAY

          TAKING AMBIEN 10 MG TABLET 1 TABLET AT BEDTIME AS NEEDED ORALLY

          ONCE A DAY

          TAKING VITAMIN D 1000 UNIT TABLET 1 TABLET ORALLY ONCE A DAY

          TAKING ZOLOFT 25 MG TABLET 1 TABLET ORALLY ONCE A DAY

          TAKING CEFACLOR 250 MG CAPSULE 1 CAPSULE ORALLY EVERY 8 HRS AS

          NEEDED

          TAKING GABAPENTIN 600 MG TABLET 1 TABLET ORALLY BID, NOTES: 2100

          NOT-TAKING GABAPENTIN 400 MG CAPSULE 1 CAPSULE ORALLY ONCE A DAY

          NOT-TAKING HYDROXYZINE HCL 25 MG TABLET 1 TABLET AS NEEDED ORALLY

          EVERY 8 HRS, NOTES: NOT LATELY

          NOT-TAKING NORETHINDRONE ACETATE 5 MG TABLET 1 TABLET ORALLY ONCE

          A DAY

          NOT-TAKING ESTRACE 2 MG TABLET 1 TABLET ORALLY ONCE A DAY

          NOT-TAKING NORETHINDRONE ACETATE 5 MG TABLET 1 TABLET ORALLY ONCE

          A DAY

          NOT-TAKING HYDRALAZINE HCL 25 MG TABLET 1 TABLET WITH FOOD ORALLY

          THREE TIMES A DAY

          MEDICATION LIST REVIEWED AND RECONCILED WITH THE PATIENT

           

PAST MEDICAL HISTORY

           

          UTIS

          KIDNEY CYST

          CHRONIC BACK PAIN

          MIGRAINE

          PREMATURE OVARIAN FAILURE

          OSTEOPOROSIS

          DEGENERATIVE DISK DISEASE

          ALPORTS SYNDROME

           

ALLERGIES

           

          VICODIN: N/V, RASH - ALLERGY

          CAT ALLERGY: CONGESTION - ALLERGY

          LEXAPRO: PROLONGED QT - SIDE EFFECTS

           

SOCIAL HISTORY

           

          GENERAL:

           

          TOBACCO USE

          ARE YOU A:NONSMOKER

           

           

          LATEX QUESTIONNAIRE

          LATEX ALLERGY : HAVE YOU EVER DEVELOPED ANY TYPE OF REACTION

          AFTER HANDLING LATEX PRODUCTS SUCH AS RUBBER GLOVES, CONDOMS,

          DIAPHRAGMS, BALLOONS, SOCKS, OR UNDERWEAR?NO

          LATEX ALLERGY : HAVE YOU EVER DEVELOPED ANY TYPE OF REACTION

          DURING OR AFTER DENTAL APPOINTMENT, VAGINAL/RECTAL EXAMINATION,

          SURGICAL PROCEDURE, OR ANY OTHER EXPOSURE?NO

          DATE ASKED : 2021

          LATEX RISK : HAVE YOU EVER HAD ANY DIFFICULTY BREATHING OR HIVES

          AFTER EATING OR HANDLING ANY FRUITS, OR VEGETABLES; SUCH AS KIWI,

          BANANAS, STONE FRUITS, OR CHESTNUTSNO

          LATEX RISK : DO YOU HAVE A PREVIOUS PERSONAL HISTORY OF MORE THAN

          NINE SURGERIES, SPINA BIFIDA, OR REPEATED CATHERIZATIONS? NO

          LATEX RISK : ARE YOU FREQUENTLY EXPOSED TO LATEX PRODUCTS IN YOUR

          OCCUPATION?NO

           

           

          ALCOHOL USE: NO.

           

           

          ALCOHOL SCREENING

          DID YOU HAVE A DRINK CONTAINING ALCOHOL IN THE PAST YEAR?NO

          POINTS0

          INTERPRETATIONNEGATIVE

           

           

          RECREATIONAL DRUG USE

          DRUG USE?NO

           

           

          CAFFEINE

          CAFFEINE USE?YES 2 CUPS DAY

           

           

          Confucianism

          [id number] Scientologist NO Orthodox BELIEFS THAT WOULD IMPACT

          HEALTH CARE.

           

           

          LANGUAGE

          LANGUAGES SPOKEN:ENGLISH

           

           

          LEARNING BARRIERS / SPECIAL NEEDS

          CHANGE FROM LAST VISIT?NO

          BARRIERS TO LEARNING?NO

          HEARING IMPAIRED?NO

          VISION IMPAIRED?YES

          COGNITIVELY IMPAIRED?NO

          :CORRECTIVE LENSES

          READINESS TO LEARN?YES

          LEARNING PREFERENCES?NO

          LEARNING CAPABILITIES PRESENT?YES

          EMOTIONAL BARRIERS?NO

          SPECIAL DEVICES?NO

           NEEDED?NO

           

           

          DOMESTIC VIOLENCE

          STATUS:

          DO YOU FEEL SAFE IN YOUR ENVIRONMENT?YES

           

           

          OCCUPATION: SELF EMPLOYED.

           

           

          DIET: REGULAR.

           

           

          EXERCISE: DAILY.

           

           

          MARITAL STATUS: .

           

           

          -

          HAS THE PATIENT BEEN EDUCATED REGARDING HIS/HER PLAN OF CARE?YES

          HAS THE PATIENT BEEN EDUCATED REGARDING PAIN, THE RISK FOR PAIN,

          THE IMPORTANCE OF EFFECTIVE PAIN MANAGEMENT, AND THE PAIN

          ASSESSMENT PROCESS?YES

           

           

          HOUSING: RENTS APARTMENT.

           

           

          ADVANCE DIRECTIVE

          ADVANCE DIRECTIVE DISCUSSED WITH PATIENT:YES PT DOES NOT HAVE ANY

          ADVANCED DIRECTIVES AND SHE DECLINED INFORMATION ON HCP AT THIS

          TIME

           

VITAL SIGNS

           

          .4 LBS, HT 70 IN, BMI 25.74 INDEX, /80 MM HG, HR 75

          /MIN, RR 18 /MIN, TEMP 96.0 F, OXYGEN SAT % 99%, SAFE IN ENV?

          (Y/N) Y, NA INITIALS AW 1337, REVIEWED BY: JESS HERNANDEZ.

           

EXAMINATION

           

      GENERAL: A HISTORY AND PHYSICAL EXAM ON THE PATIENT

          WAS DONE ON 2021(DATE OF ORIGINAL ASSESSMENT) IN

          PREPARATION OF SURGERY/PROCEDURE. I HAVE NOW REASSESSED THIS

          PATIENT'S HEALTH STATUS AND PERFORMED AN UPDATED EXAM TODAY. ALL

          CHANGES IN THE PATIENT'S HISTORY, PHYSICAL EXAM, PRE-EXISTING

          CONDITONS, AND INDICATIONS/CONTRAINDICATIONS TO THE PLANNED

          PROCEDURE AND ANESTHESIA ARE DOCUMENTED AND EVALUATED BELOW. I

          ATTEST TO THE ADEQUACY AND APPROPRIATENESS OF MY ASSESSMENT, AND

          CONFIRM THE NECESSITY FOR THE PLANNED PROCEDURE. THE PATIENT IS

          ALERT, ORIENTED TIMES THREE AND COOPERATIVE. LUNGS ARE CLEAR TO

          AUSCULTATION. HEART SHOWS REGULAR RHYTHM, NO MURMURS AND NO

          GALLOPS.

           

ASSESSMENTS

           

          SPONDYLOSIS WITHOUT MYELOPATHY OR RADICULOPATHY, LUMBAR REGION -

          M47.816 (PRIMARY)

           

          SPONDYLOSIS WITHOUT MYELOPATHY OR RADICULOPATHY, LUMBOSACRAL

          REGION - M47.817

           

TREATMENT

           

      SPONDYLOSIS WITHOUT MYELOPATHY OR RADICULOPATHY,

          LUMBAR REGION

          SMC FACET BLOCK (PAIN)[id number]

          COMPLETION OF PROCEDURAL VISIT WHEN MEETS CRITERIASAROJ GRANADOS

          4/15/2021 3:03:00 PM > CRITERIA MET

           

           

      OTHERS

          NOTES: PAT DONE 21 RADHA QUESADA RN BSN.

           

PROCEDURES

           

      PAIN NURSING RECORD

          PROCEDURE    IN ROOM 1417, PHYSICIAN IN ROOM 1428, START 1433,

          FINISH 1437, PHYSICIAN OUT OF ROOM 1438, ECG NORMAL SINUS,

          PATIENT SHIELDED YES, SAFETY STRAP YES, PREP CHLOROPREP BY VICENTA GRANADOS RN, DRESSING TEGADERM

          LOC:    SAROJ GRANADOS 4/15/2021 1:47:36 PM > 1. ALERT, ORIENTED

          RESP:    SAROJ GRANADOS 4/15/2021 1:47:41 PM > 1. REGULAR, NO

          DYSPNEA

          COLOR:    SAROJ GRANADOS 4/15/2021 1:47:44 PM > 1. PINK

          SKIN:    SAROJ GRANADOS 4/15/2021 1:47:48 PM > 1. WARM, DRY

          POSITION:    SAROJ GRANADOS 4/15/2021 1:48:15 PM > 5. SITTING

          SAROJ GRANADOS 4/15/2021 2:19:59 PM > 1. PRONE

          VITALS:    SAROJ GRANADOS 4/15/2021 2:20:09 PM > 128/76,65,16,100%

          SAROJ GRANADOS 4/15/2021 2:27:46 PM > 127/77,70,16,100%

          SAROJ GRANADOS 4/15/2021 2:42:05 PM > 119/68,78,16,100%

          SAROJ GRANADOS 4/15/2021 2:52:20 PM > 147/95,76,16,100%

          COMPLETION OF PROCEDURE APPOINTMENT:    POST PAIN 0, DRESSING

          SITE DRY AND INTACT, IV N/A, GAIT STEADY, TEACHING COMPLETED,

          PATIENT ACKNOWLEDGES UNDERSTANDING YES, PROCEDURE APPOINTMENT

          COMPLETED AT 1454 BY: VICENTA GRANADOS RN

      PN LUMBAR FACET BLOCK DIAGNOSTIC

          PRE PROCEDURE DIAGNOSIS    LUMBAR SPONDYLOSIS, LUMBOSACRAL

          SPONDYLOSIS

          POST PROCEDURE DIAGNOSIS    LUMBAR SPONDYLOSIS, LUMBOSACRAL

          SPONDYLOSIS

          PROCEDURE    LEFT L4-L5 AND LEFT L5-S1 FACET BLOCK DIAGNOSTIC

          NUMBER 2

          SURGEON    DR. DIANA THOMPSON

          ASSISTANT    NONE

          ANESTHESIA    LOCAL

          PRE PROCEDURE NOTE    THE PATIENT WITH HISTORY OF CHRONIC LOW

          BACK PAIN. I EVALUATED THE PATIENT AND REVIEWED THE CHART. I WENT

          OVER THE RISKS, ALTERNATIVES, AND BENEFITS ASSOCIATED WITH THIS

          PROCEDURE. THE PATIENT WOULD LIKE TO PROCEED AND GAVE CONSENT TO

          PERFORM THE PROCEDURE. AS AGREED WITH THE PATIENT, WE ARE DOING

          THIS PROCEDURE TO DETERMINE IF THE PATIENT IS A CANDIDATE FOR A

          RADIOFREQUENCY ABLATION OF THE FACETS JOINTS. THE PATIENT DENIES

          UNEXPLAINABLE WEIGHT LOSS, FEVER, CHILLS, OR NEW CHANGES IN

          URINARY OR BOWEL CONTROL. THE PATIENT IS COVID-19 NEGATIVE

          DESCRIPTION OF PROCEDURE    THE PATIENT WAS BROUGHT TO THE

          PROCEDURE ROOM AND PLACED IN THE PRONE POSITION. THE LUMBOSACRAL

          AREA WAS CLEANED WITH CHLORAPREP SOLUTION AND DRAPED ASEPTICALLY.

          THE PROCEDURE WAS DONE UNDER STERILE CONDITIONS. A TIMEOUT WAS

          PERFORMED WHERE THE CONSENTED SITE WAS VERIFIED WITH EVERYONE IN

          THE ROOM. UNDER FLUOROSCOPIC GUIDANCE, TARGETS WERE SELECTED AT

          THE INTERSECTION OF THE LEFT TRANSVERSE PROCESS OF L4, L5 AND ALA

          OF S1 WITH ITS RESPECTIVE SUPERIOR ARTICULAR PROCESS WITH A

          TARGET OF THE MEDIAN BRANCHES OF L3, L4 AND THE DORSAL RAMI OF

          L5. I CONFIRMED AGAIN THE SITE OF TARGET. LIDOCAINE WAS USED TO

          NUMB THE SKIN AND THE SUBCUTANEOUS TISSUE BELOW IT. SPINAL

          NEEDLE, 22-GAUGE, WAS ADVANCED UNDER FLUOROSCOPIC GUIDANCE AND

          FOLLOWING PATIENT FEEDBACK UNTIL THE TARGETS WERE REACHED.

          POSITION OF THE NEEDLES WAS VERIFIED WITH AP AND LATERAL VIEWS.

          AFTER PROPER POSITION OF THE NEEDLES WAS ACHIEVED, ISOVUE-M DYE

          30%, 0.1 ML, WAS INJECTED AT EACH SITE SHOWING ADEQUATE SPREAD OF

          THE DYE. THEN, A SOLUTION OF 0.4 ML OF BUPIVACAINE 0.25% WAS

          INJECTED AT EACH SITE. THE MEDICATIONS WERE VERIFIED WITH THE

          NURSE. THERE WAS NO EVIDENCE OF BLOOD, PARESTHESIA OR

          CEREBROSPINAL FLUID DURING THE PROCEDURE. THE PATIENT WAS SENT TO

          THE RECOVERY ROOM. THE PATIENT WAS MOVING THE EXTREMITIES AND

          DOING WELL. THERE WERE NO COMPLICATIONS DURING THE PROCEDURE.

          ESTIMATED BLOOD LOSS WAS LESS THAN 5 ML. FLUOROSCOPY TIME WAS 19

          SECONDS

          POST PROCEDURE NOTE    THE PATIENT WILL DOCUMENT THE PAIN LEVEL

          AND RESPONSE TO THIS PROCEDURE PER PAIN DIARY. THE PATIENT WILL

          BE SEEN IN A FOLLOW UP IN THE NEXT FEW WEEKS. FURTHER

          DETERMINATION FOR THE PATIENT'S CASE WILL BE DONE AT THE NEXT

          VISIT. INSTRUCTIONS WERE GIVEN, QUESTIONS WERE ANSWERED, AND THE

          PATIENT EXPRESSED UNDERSTANDING AND AGREED WITH THE PLAN. I,

          YASH COTA, DOCUMENTED THE ABOVE INFORMATION ACTING AS A

          SCRIBE FOR DR. THOMPSON. I HAVE REVIEWED THE ABOVE DOCUMENT,

          WRITTEN BY YASH COTA, MEDICAL SCRIBE, AND I VERIFY THAT

          IT IS ACCURATE

           

PROCEDURE CODES

           

          46109 INJ PARAVERT F JNT L/S 1 LEV, MODIFIERS: LT

           

          28282 INJ PARAVERT F JNT L/S 2 LEV, MODIFIERS: LT

           

DISPOSITION & COMMUNICATION

           

FOLLOW UP

           

          FOLLOW UP WITH NP (REASON: POST LEFT DIAGNOSTIC LUMBAR FACET

          BLOCK L4-L5, L5-S1)

           

 

ELECTRONICALLY SIGNED BY DIANA THOMPSON MD, MD ON

          2021 AT 12:33 PM EDT

           

           

           

 

DISCLAIMER :

THIS IS A VISIT SUMMARY EXTRACTED FROM THE [employer] CHART.

IT IS NOT A COPY OF THE [employer] PROGRESS NOTE.

FADY

## 2021-05-06 ENCOUNTER — HOSPITAL ENCOUNTER (OUTPATIENT)
Dept: HOSPITAL 53 - M PAIN | Age: 31
End: 2021-05-06
Attending: NURSE PRACTITIONER
Payer: COMMERCIAL

## 2021-05-06 DIAGNOSIS — M81.0: ICD-10-CM

## 2021-05-06 DIAGNOSIS — M47.816: ICD-10-CM

## 2021-05-06 DIAGNOSIS — Z88.5: ICD-10-CM

## 2021-05-06 DIAGNOSIS — J30.81: ICD-10-CM

## 2021-05-06 DIAGNOSIS — Z79.899: ICD-10-CM

## 2021-05-06 DIAGNOSIS — G43.909: ICD-10-CM

## 2021-05-06 DIAGNOSIS — Q87.81: ICD-10-CM

## 2021-05-06 DIAGNOSIS — E28.39: ICD-10-CM

## 2021-05-06 DIAGNOSIS — G89.29: Primary | ICD-10-CM

## 2021-05-06 DIAGNOSIS — N28.1: ICD-10-CM

## 2021-05-06 DIAGNOSIS — Z88.8: ICD-10-CM

## 2021-05-08 NOTE — ECWPNPC
PATIENT NAME: COBY SHIELDS

: 1990

GENDER: FEMALE

MRN: [id number]

VISIT DATE: 2021

DISCHARGE DATE: 21 1018

VISIT LOCKED DATE TIME: 

PHYSICIAN: MANJULA BARRON

RESOURCE: MANJULA BARRON

 

           

           

REASON FOR APPOINTMENT

           

          1. POST LEFT DIAGNOSTIC LUMBAR FACET BLOCK L4-5,L5-S1

           

HISTORY OF PRESENT ILLNESS

           

      DEPRESSION SCREENING:

      PHQ-2 (2015 EDITION)

           

           

          LITTLE INTEREST OR PLEASURE IN DOING THINGS?NOT AT ALL

          FEELING DOWN, DEPRESSED, OR HOPELESS?NOT AT ALL

          TOTAL SCORE0

           

      GENERAL:  HERE FOR POST PROCEDURE FOLLOW-UP. HAD

          LEFT DIAGNOSTIC LUMBAR FACET BLOCK L4-5, L5-S1 #2 ON 04/15/2021.

          REPORTING 24 HOURS % PAIN REDUCTION AND THEN PAIN GRADUALLY

          RETURNED TO BASELINE. CONTINUES TO BENEFIT FROM RIGHT LUMBAR

          RADIOFREQUENCY. DISCUSSED RADIOFREQUENCY. PATIENT WILL BE HAVING

          COVID IMMUNIZATION #2 IN THE NEXT FEW WEEKS. WE WILL SCHEDULE

          THIRD WEEK OF  FOR RADIOFREQUENCY.

           -.

           

      FALL RISK SCREENING:

      SCREENING

          : NO FALLS REPORTED IN THE LAST YEAR.

           

      PAIN SCREENING:

      PATIENT HAS A COMPLAINT OF ACUTE OR CHRONIC PAIN

           

           

          :YES

          LOCATION OF PAIN:LOW BACK

          INTENSITY OF PAIN (SCALE OF 1 TO 10):6

          WHAT DOES YOUR PAIN FEEL LIKE:ACHING, TENDER, THROBBING

          DURATION:CONTINOUS, CONSTANT, ALL DAY

          PAIN IS INCREASED BY:ACTIVITIES

          PAIN IS DECREASED BY:OTHERS HEATING PAD AND HOT BATH OR ICE

           

      NURSING NOTE:

           -.

           

      PAIN CENTER INTAKE QUESTIONS:

      DO YOU HAVE A HISTORY OF MRSA?

           

           

          :NO

           

      DO YOU TAKE A BLOOD THINNERS?

           

           

          :NO

           

      DO YOU HAVE ANY BLEEDING DISORDERS?

           

           

          :NO

           

      ANY NEW NUMBNESS OR WEAKNESS IN YOUR LEGS OR ARMS?

           

           

          :NO

           

      ANY PACEMAKER,DEFIBRILLATOR, OR DORSAL COLUMN

          STIMULATOR?

           

           

          :NO

           

      DO YOU HAVE ANY RASHES OR OPEN SORES?

           

           

          :NO

           

      ARE YOU ALLERGIC TO IV DYE?

           

           

          :NO

           

      ARE YOU DIABETIC?

           

           

          :NO

           

      ANY NEW PROBLEMS WITH YOUR MEDICATIONS?

           

           

          :NO

           

      HAVE YOU RECEIVED A VACCINE IN THE PAST 30 DAYS?

           

           

          :YES 1ST COVID SHOT 2021

           

      DO YOU PLAN TO RECEIVE A VACCINE IN THE NEXT 21

          DAYS?

           

           

          :YES

          IF SO WHAT VACCINE AND WHEN? 2ND COVID SHOT 2021

           

      DO YOU NEED ANY PRESCRIPTION?

           

           

          :NO

           

      DO YOU TAKE ANY IMMUNOSUPPRESSIVE MEDICATIONS?

           

           

          :NO

           

      IS THERE A CHANCE YOU COULD BE PREGNANT?

           

           

          :NO

           

      ARE YOU BREAST FEEDING?

           

           

          :NO

           

CURRENT MEDICATIONS

           

          TAKING FLONASE ALLERGY RELIEF 50 MCG/ACT SUSPENSION 1 SPRAY IN

          EACH NOSTRIL NASALLY ONCE A DAY

          TAKING MELATONIN 3 MG TABLET AS DIRECTED ORALLY

          TAKING CALCIUM 1000 + D 1000-800 MG-UNIT TABLET 1 TABLET WITH A

          MEAL ORALLY ONCE A DAY

          TAKING WELLBUTRIN  MG TABLET EXTENDED RELEASE 24 HOUR 1

          TABLET IN THE MORNING ORALLY ONCE A DAY

          TAKING AMBIEN 10 MG TABLET 1 TABLET AT BEDTIME AS NEEDED ORALLY

          ONCE A DAY

          TAKING VITAMIN D 1000 UNIT TABLET 1 TABLET ORALLY ONCE A DAY

          TAKING ZOLOFT 25 MG TABLET 1 TABLET ORALLY ONCE A DAY

          TAKING CEFACLOR 250 MG CAPSULE 1 CAPSULE ORALLY EVERY 8 HRS AS

          NEEDED

          TAKING GABAPENTIN 600 MG TABLET 1 TABLET ORALLY BID, NOTES: 2100

          NOT-TAKING GABAPENTIN 400 MG CAPSULE 1 CAPSULE ORALLY ONCE A DAY

          NOT-TAKING HYDROXYZINE HCL 25 MG TABLET 1 TABLET AS NEEDED ORALLY

          EVERY 8 HRS, NOTES: NOT LATELY

          NOT-TAKING NORETHINDRONE ACETATE 5 MG TABLET 1 TABLET ORALLY ONCE

          A DAY

          NOT-TAKING ESTRACE 2 MG TABLET 1 TABLET ORALLY ONCE A DAY

          NOT-TAKING NORETHINDRONE ACETATE 5 MG TABLET 1 TABLET ORALLY ONCE

          A DAY

          NOT-TAKING HYDRALAZINE HCL 25 MG TABLET 1 TABLET WITH FOOD ORALLY

          THREE TIMES A DAY

          MEDICATION LIST REVIEWED AND RECONCILED WITH THE PATIENT

           

PAST MEDICAL HISTORY

           

          UTIS

          KIDNEY CYST

          CHRONIC BACK PAIN

          MIGRAINE

          PREMATURE OVARIAN FAILURE

          OSTEOPOROSIS

          DEGENERATIVE DISK DISEASE

          ALPORTS SYNDROME

           

ALLERGIES

           

          VICODIN: N/V, RASH - ALLERGY

          CAT ALLERGY: CONGESTION - ALLERGY

          LEXAPRO: PROLONGED QT - SIDE EFFECTS

           

SOCIAL HISTORY

           

          GENERAL:

           

          TOBACCO USE

          ARE YOU A:NONSMOKER

           

           

          LATEX QUESTIONNAIRE

          LATEX ALLERGY : HAVE YOU EVER DEVELOPED ANY TYPE OF REACTION

          AFTER HANDLING LATEX PRODUCTS SUCH AS RUBBER GLOVES, CONDOMS,

          DIAPHRAGMS, BALLOONS, SOCKS, OR UNDERWEAR?NO

          LATEX ALLERGY : HAVE YOU EVER DEVELOPED ANY TYPE OF REACTION

          DURING OR AFTER DENTAL APPOINTMENT, VAGINAL/RECTAL EXAMINATION,

          SURGICAL PROCEDURE, OR ANY OTHER EXPOSURE?NO

          LATEX RISK : HAVE YOU EVER HAD ANY DIFFICULTY BREATHING OR HIVES

          AFTER EATING OR HANDLING ANY FRUITS, OR VEGETABLES; SUCH AS KIWI,

          BANANAS, STONE FRUITS, OR CHESTNUTSNO

          LATEX RISK : DO YOU HAVE A PREVIOUS PERSONAL HISTORY OF MORE THAN

          NINE SURGERIES, SPINA BIFIDA, OR REPEATED CATHERIZATIONS? NO

          LATEX RISK : ARE YOU FREQUENTLY EXPOSED TO LATEX PRODUCTS IN YOUR

          OCCUPATION?NO

          DATE ASKED : 2021

           

           

          ALCOHOL USE: NO.

           

           

          ALCOHOL SCREENING

          DID YOU HAVE A DRINK CONTAINING ALCOHOL IN THE PAST YEAR?NO

          POINTS0

          INTERPRETATIONNEGATIVE

           

           

          RECREATIONAL DRUG USE

          DRUG USE?NO

           

           

          CAFFEINE

          CAFFEINE USE?YES 2 CUPS DAY

           

           

          Spiritism

          [id number] BELIEFS THAT WOULD IMPACT

          HEALTH CARE.

           

           

          LANGUAGE

          LANGUAGES SPOKEN:ENGLISH

           

           

          LEARNING BARRIERS / SPECIAL NEEDS

          CHANGE FROM LAST VISIT?NO

          BARRIERS TO LEARNING?NO

          HEARING IMPAIRED?NO

          VISION IMPAIRED?YES

          :CORRECTIVE LENSES

          COGNITIVELY IMPAIRED?NO

          READINESS TO LEARN?YES

          LEARNING PREFERENCES?NO

          LEARNING CAPABILITIES PRESENT?YES

          EMOTIONAL BARRIERS?NO

          SPECIAL DEVICES?NO

           NEEDED?NO

           

           

          DOMESTIC VIOLENCE

          STATUS:

          DO YOU FEEL SAFE IN YOUR ENVIRONMENT?YES

           

           

          OCCUPATION: SELF EMPLOYED.

           

           

          DIET: REGULAR.

           

           

          EXERCISE: DAILY.

           

           

          MARITAL STATUS: .

           

           

          -

          HAS THE PATIENT BEEN EDUCATED REGARDING HIS/HER PLAN OF CARE?YES

          HAS THE PATIENT BEEN EDUCATED REGARDING PAIN, THE RISK FOR PAIN,

          THE IMPORTANCE OF EFFECTIVE PAIN MANAGEMENT, AND THE PAIN

          ASSESSMENT PROCESS?YES

           

           

          HOUSING: [employer].

           

           

          ADVANCE DIRECTIVE

          ADVANCE DIRECTIVE DISCUSSED WITH PATIENT:YES PT DOES NOT HAVE ANY

          ADVANCED DIRECTIVES AND SHE DECLINED INFORMATION ON HCP AT THIS

          TIME

           

REVIEW OF SYSTEMS

           

      CONSTITUTIONAL:

           

          ANY RECENT FEVER    NO . CHILLS    NO . WEIGHT CHANGE OF UNKNOWN

          REASONS    NO .

           

      GASTROENTEROLOGY:

           

          NEW UNEXPLAINABLE CHANGES IN BOWEL CONTROL    NO . CONSTIPATION  

           NO .

           

      GENITOURINARY:

           

          ANY NEW CHANGE IN BLADDER CONTROL?    NO .

           

      NEUROLOGY:

           

          NEW ONSET DIZZINESS OR NEUROLOGICAL CHANGES NOT MENTIONED    NO .

          NEW NUMBNESS OR PAIN PATTERNS NOT MENTIONED AND PERTINENT TO

          TODAY'S VISIT    NO .

           

      CARDIOLOGY:

           

          NEW CHEST PRESSURE    NO . PATIENT DENIES    NO .

           

      RESPIRATORY:

           

          UNEXPLAINABLE COUGH    NO . NEW SHORTNESS OF BREATH    NO .

           

VITAL SIGNS

           

          .8 LBS, HT 70 IN, BMI 25.80 INDEX, /70 MM HG, 

          /MIN, RR 18 /MIN, TEMP 96.0 F, OXYGEN SAT % 92%, SAFE IN ENV?

          (Y/N) YES, NA INITIALS SC 09:42T.SONAM GREER.

           

EXAMINATION

           

      GENERAL EXAMINATION:

          GENERAL AWAKE,ALERT ,PLEAASANT .

           

          PSYCH AFFECT NORMAL .

           

          LUNGS: LUNG FIELDS ARE CLEAR TO AUSCULTATION BILATERALLY. GOOD

          MOVEMENT OF AIR .

           

          HEART: S1, S2 IN A REGULAR RATE AND RHYTHM. NO SIGNIFICANT

          MURMURS, RUBS OR GALLOPS NOTED .

           

          LUMBAR:PALPATION:TENDER OVER LEFT. L4/5-L5/S1 LUMBAR FACETS WITH

          FACET LOADING..

           

ASSESSMENTS

           

          SPONDYLOSIS WITHOUT MYELOPATHY OR RADICULOPATHY, LUMBAR REGION -

          M47.816 (PRIMARY)

           

          OTHER CHRONIC PAIN - G89.29

           

TREATMENT

           

      SPONDYLOSIS WITHOUT MYELOPATHY OR RADICULOPATHY,

          LUMBAR REGION

          MEDICATION: VALIUM TAB 10MG ORALLY (DIAZEPAM) (ORDERED FOR

          2021)

          NOTES: LEFT LUMBAR COOL RADIOFREQUENCY L4-5,L5-S1

          REVIEWED PRE PROCEDURE INFORMATION, PATIENT VERBALIZED

          UNDERSTANDING KYRA GREER.

           

           

      OTHER CHRONIC PAIN

          PAIN PROCEDURE LOGDATE OF PROCEDURE4/15/2021PROCEDURE:LEFT

          DIAGNOSTIC LUMBAR FACET BLOCK L4-L5,L5-H1FOYLLD OF PRE

          SEDATE0/0RESULT:24 HOURS % PAIN REDUCTION THEN PAIN

          GRADUALLY RETURNED TO BASELINE

           

PROCEDURE CODES

           

           ESTABILISHED PATIENT [facility] CHARGE

           

DISPOSITION & COMMUNICATION

           

FOLLOW UP

           

          SCHEDULE 3RD OR 4TH WK IN -COVID IMMO (REASON: LEFT LUMBAR

          COOL RADIOFREQUENCY L4-5,L5-S1)

           

 

ELECTRONICALLY SIGNED BY MISAEL GIVENS ON

          2021 AT 11:41 AM EDT

           

           

           

 

DISCLAIMER :

THIS IS A VISIT SUMMARY EXTRACTED FROM THE [employer] CHART.

IT IS NOT A COPY OF THE [employer] PROGRESS NOTE.

FADY

## 2021-05-08 NOTE — ECWPNPC
PATIENT NAME: COBY SHIELDS

: 1990

GENDER: FEMALE

MRN: [id number]

VISIT DATE: 2021

DISCHARGE DATE: 21 1018

VISIT LOCKED DATE TIME: 

PHYSICIAN: MANJULA BARRON

RESOURCE: MANJULA BARRON

 

           

           

REASON FOR APPOINTMENT

           

          1. POST LEFT DIAGNOSTIC LUMBAR FACET BLOCK L4-5,L5-S1

           

HISTORY OF PRESENT ILLNESS

           

      DEPRESSION SCREENING:

      PHQ-2 (2015 EDITION)

           

           

          LITTLE INTEREST OR PLEASURE IN DOING THINGS?NOT AT ALL

          FEELING DOWN, DEPRESSED, OR HOPELESS?NOT AT ALL

          TOTAL SCORE0

           

      GENERAL:  HERE FOR POST PROCEDURE FOLLOW-UP. HAD

          LEFT DIAGNOSTIC LUMBAR FACET BLOCK L4-5, L5-S1 #2 ON 04/15/2021.

          REPORTING 24 HOURS % PAIN REDUCTION AND THEN PAIN GRADUALLY

          RETURNED TO BASELINE. CONTINUES TO BENEFIT FROM RIGHT LUMBAR

          RADIOFREQUENCY. DISCUSSED RADIOFREQUENCY. PATIENT WILL BE HAVING

          COVID IMMUNIZATION #2 IN THE NEXT FEW WEEKS. WE WILL SCHEDULE

          THIRD WEEK OF  FOR RADIOFREQUENCY.

           -.

           

      FALL RISK SCREENING:

      SCREENING

          : NO FALLS REPORTED IN THE LAST YEAR.

           

      PAIN SCREENING:

      PATIENT HAS A COMPLAINT OF ACUTE OR CHRONIC PAIN

           

           

          :YES

          LOCATION OF PAIN:LOW BACK

          INTENSITY OF PAIN (SCALE OF 1 TO 10):6

          WHAT DOES YOUR PAIN FEEL LIKE:ACHING, TENDER, THROBBING

          DURATION:CONTINOUS, CONSTANT, ALL DAY

          PAIN IS INCREASED BY:ACTIVITIES

          PAIN IS DECREASED BY:OTHERS HEATING PAD AND HOT BATH OR ICE

           

      NURSING NOTE:

           -.

           

      PAIN CENTER INTAKE QUESTIONS:

      DO YOU HAVE A HISTORY OF MRSA?

           

           

          :NO

           

      DO YOU TAKE A BLOOD THINNERS?

           

           

          :NO

           

      DO YOU HAVE ANY BLEEDING DISORDERS?

           

           

          :NO

           

      ANY NEW NUMBNESS OR WEAKNESS IN YOUR LEGS OR ARMS?

           

           

          :NO

           

      ANY PACEMAKER,DEFIBRILLATOR, OR DORSAL COLUMN

          STIMULATOR?

           

           

          :NO

           

      DO YOU HAVE ANY RASHES OR OPEN SORES?

           

           

          :NO

           

      ARE YOU ALLERGIC TO IV DYE?

           

           

          :NO

           

      ARE YOU DIABETIC?

           

           

          :NO

           

      ANY NEW PROBLEMS WITH YOUR MEDICATIONS?

           

           

          :NO

           

      HAVE YOU RECEIVED A VACCINE IN THE PAST 30 DAYS?

           

           

          :YES 1ST COVID SHOT 2021

           

      DO YOU PLAN TO RECEIVE A VACCINE IN THE NEXT 21

          DAYS?

           

           

          :YES

          IF SO WHAT VACCINE AND WHEN? 2ND COVID SHOT 2021

           

      DO YOU NEED ANY PRESCRIPTION?

           

           

          :NO

           

      DO YOU TAKE ANY IMMUNOSUPPRESSIVE MEDICATIONS?

           

           

          :NO

           

      IS THERE A CHANCE YOU COULD BE PREGNANT?

           

           

          :NO

           

      ARE YOU BREAST FEEDING?

           

           

          :NO

           

CURRENT MEDICATIONS

           

          TAKING FLONASE ALLERGY RELIEF 50 MCG/ACT SUSPENSION 1 SPRAY IN

          EACH NOSTRIL NASALLY ONCE A DAY

          TAKING MELATONIN 3 MG TABLET AS DIRECTED ORALLY

          TAKING CALCIUM 1000 + D 1000-800 MG-UNIT TABLET 1 TABLET WITH A

          MEAL ORALLY ONCE A DAY

          TAKING WELLBUTRIN  MG TABLET EXTENDED RELEASE 24 HOUR 1

          TABLET IN THE MORNING ORALLY ONCE A DAY

          TAKING AMBIEN 10 MG TABLET 1 TABLET AT BEDTIME AS NEEDED ORALLY

          ONCE A DAY

          TAKING VITAMIN D 1000 UNIT TABLET 1 TABLET ORALLY ONCE A DAY

          TAKING ZOLOFT 25 MG TABLET 1 TABLET ORALLY ONCE A DAY

          TAKING CEFACLOR 250 MG CAPSULE 1 CAPSULE ORALLY EVERY 8 HRS AS

          NEEDED

          TAKING GABAPENTIN 600 MG TABLET 1 TABLET ORALLY BID, NOTES: 2100

          NOT-TAKING GABAPENTIN 400 MG CAPSULE 1 CAPSULE ORALLY ONCE A DAY

          NOT-TAKING HYDROXYZINE HCL 25 MG TABLET 1 TABLET AS NEEDED ORALLY

          EVERY 8 HRS, NOTES: NOT LATELY

          NOT-TAKING NORETHINDRONE ACETATE 5 MG TABLET 1 TABLET ORALLY ONCE

          A DAY

          NOT-TAKING ESTRACE 2 MG TABLET 1 TABLET ORALLY ONCE A DAY

          NOT-TAKING NORETHINDRONE ACETATE 5 MG TABLET 1 TABLET ORALLY ONCE

          A DAY

          NOT-TAKING HYDRALAZINE HCL 25 MG TABLET 1 TABLET WITH FOOD ORALLY

          THREE TIMES A DAY

          MEDICATION LIST REVIEWED AND RECONCILED WITH THE PATIENT

           

PAST MEDICAL HISTORY

           

          UTIS

          KIDNEY CYST

          CHRONIC BACK PAIN

          MIGRAINE

          PREMATURE OVARIAN FAILURE

          OSTEOPOROSIS

          DEGENERATIVE DISK DISEASE

          ALPORTS SYNDROME

           

ALLERGIES

           

          VICODIN: N/V, RASH - ALLERGY

          CAT ALLERGY: CONGESTION - ALLERGY

          LEXAPRO: PROLONGED QT - SIDE EFFECTS

           

SOCIAL HISTORY

           

          GENERAL:

           

          TOBACCO USE

          ARE YOU A:NONSMOKER

           

           

          LATEX QUESTIONNAIRE

          LATEX ALLERGY : HAVE YOU EVER DEVELOPED ANY TYPE OF REACTION

          AFTER HANDLING LATEX PRODUCTS SUCH AS RUBBER GLOVES, CONDOMS,

          DIAPHRAGMS, BALLOONS, SOCKS, OR UNDERWEAR?NO

          LATEX ALLERGY : HAVE YOU EVER DEVELOPED ANY TYPE OF REACTION

          DURING OR AFTER DENTAL APPOINTMENT, VAGINAL/RECTAL EXAMINATION,

          SURGICAL PROCEDURE, OR ANY OTHER EXPOSURE?NO

          LATEX RISK : HAVE YOU EVER HAD ANY DIFFICULTY BREATHING OR HIVES

          AFTER EATING OR HANDLING ANY FRUITS, OR VEGETABLES; SUCH AS KIWI,

          BANANAS, STONE FRUITS, OR CHESTNUTSNO

          LATEX RISK : DO YOU HAVE A PREVIOUS PERSONAL HISTORY OF MORE THAN

          NINE SURGERIES, SPINA BIFIDA, OR REPEATED CATHERIZATIONS? NO

          LATEX RISK : ARE YOU FREQUENTLY EXPOSED TO LATEX PRODUCTS IN YOUR

          OCCUPATION?NO

          DATE ASKED : 2021

           

           

          ALCOHOL USE: NO.

           

           

          ALCOHOL SCREENING

          DID YOU HAVE A DRINK CONTAINING ALCOHOL IN THE PAST YEAR?NO

          POINTS0

          INTERPRETATIONNEGATIVE

           

           

          RECREATIONAL DRUG USE

          DRUG USE?NO

           

           

          CAFFEINE

          CAFFEINE USE?YES 2 CUPS DAY

           

           

          Buddhist

          [id number] NO [employer] BELIEFS THAT WOULD IMPACT

          HEALTH CARE.

           

           

          LANGUAGE

          LANGUAGES SPOKEN:ENGLISH

           

           

          LEARNING BARRIERS / SPECIAL NEEDS

          CHANGE FROM LAST VISIT?NO

          BARRIERS TO LEARNING?NO

          HEARING IMPAIRED?NO

          VISION IMPAIRED?YES

          :CORRECTIVE LENSES

          COGNITIVELY IMPAIRED?NO

          READINESS TO LEARN?YES

          LEARNING PREFERENCES?NO

          LEARNING CAPABILITIES PRESENT?YES

          EMOTIONAL BARRIERS?NO

          SPECIAL DEVICES?NO

           NEEDED?NO

           

           

          DOMESTIC VIOLENCE

          STATUS:

          DO YOU FEEL SAFE IN YOUR ENVIRONMENT?YES

           

           

          OCCUPATION: SELF EMPLOYED.

           

           

          DIET: REGULAR.

           

           

          EXERCISE: DAILY.

           

           

          MARITAL STATUS: .

           

           

          -

          HAS THE PATIENT BEEN EDUCATED REGARDING HIS/HER PLAN OF CARE?YES

          HAS THE PATIENT BEEN EDUCATED REGARDING PAIN, THE RISK FOR PAIN,

          THE IMPORTANCE OF EFFECTIVE PAIN MANAGEMENT, AND THE PAIN

          ASSESSMENT PROCESS?YES

           

           

          HOUSING: [employer].

           

           

          ADVANCE DIRECTIVE

          ADVANCE DIRECTIVE DISCUSSED WITH PATIENT:YES PT DOES NOT HAVE ANY

          ADVANCED DIRECTIVES AND SHE DECLINED INFORMATION ON HCP AT THIS

          TIME

           

REVIEW OF SYSTEMS

           

      CONSTITUTIONAL:

           

          ANY RECENT FEVER    NO . CHILLS    NO . WEIGHT CHANGE OF UNKNOWN

          REASONS    NO .

           

      GASTROENTEROLOGY:

           

          NEW UNEXPLAINABLE CHANGES IN BOWEL CONTROL    NO . CONSTIPATION  

           NO .

           

      GENITOURINARY:

           

          ANY NEW CHANGE IN BLADDER CONTROL?    NO .

           

      NEUROLOGY:

           

          NEW ONSET DIZZINESS OR NEUROLOGICAL CHANGES NOT MENTIONED    NO .

          NEW NUMBNESS OR PAIN PATTERNS NOT MENTIONED AND PERTINENT TO

          TODAY'S VISIT    NO .

           

      CARDIOLOGY:

           

          NEW CHEST PRESSURE    NO . PATIENT DENIES    NO .

           

      RESPIRATORY:

           

          UNEXPLAINABLE COUGH    NO . NEW SHORTNESS OF BREATH    NO .

           

VITAL SIGNS

           

          .8 LBS, HT 70 IN, BMI 25.80 INDEX, /70 MM HG, 

          /MIN, RR 18 /MIN, TEMP 96.0 F, OXYGEN SAT % 92%, SAFE IN ENV?

          (Y/N) YES, NA INITIALS SC 09:42T.SONAM GREER.

           

EXAMINATION

           

      GENERAL EXAMINATION:

          GENERAL AWAKE,ALERT ,PLEAASANT .

           

          PSYCH AFFECT NORMAL .

           

          LUNGS: LUNG FIELDS ARE CLEAR TO AUSCULTATION BILATERALLY. GOOD

          MOVEMENT OF AIR .

           

          HEART: S1, S2 IN A REGULAR RATE AND RHYTHM. NO SIGNIFICANT

          MURMURS, RUBS OR GALLOPS NOTED .

           

          LUMBAR:PALPATION:TENDER OVER LEFT. L4/5-L5/S1 LUMBAR FACETS WITH

          FACET LOADING..

           

ASSESSMENTS

           

          SPONDYLOSIS WITHOUT MYELOPATHY OR RADICULOPATHY, LUMBAR REGION -

          M47.816 (PRIMARY)

           

          OTHER CHRONIC PAIN - G89.29

           

TREATMENT

           

      SPONDYLOSIS WITHOUT MYELOPATHY OR RADICULOPATHY,

          LUMBAR REGION

          MEDICATION: VALIUM TAB 10MG ORALLY (DIAZEPAM) (ORDERED FOR

          2021)

          NOTES: LEFT LUMBAR COOL RADIOFREQUENCY L4-5,L5-S1

          REVIEWED PRE PROCEDURE INFORMATION, PATIENT VERBALIZED

          UNDERSTANDING KYRA GREER.

           

           

      OTHER CHRONIC PAIN

          PAIN PROCEDURE LOGDATE OF PROCEDURE4/15/2021PROCEDURE:LEFT

          DIAGNOSTIC LUMBAR FACET BLOCK L4-L5,L5-S2PQVSLQ OF PRE

          SEDATE0/0RESULT:24 HOURS % PAIN REDUCTION THEN PAIN

          GRADUALLY RETURNED TO BASELINE

           

PROCEDURE CODES

           

           ESTABILISHED PATIENT [facility] CHARGE

           

DISPOSITION & COMMUNICATION

           

FOLLOW UP

           

          SCHEDULE 3RD OR 4TH WK IN -COVID IMMO (REASON: LEFT LUMBAR

          COOL RADIOFREQUENCY L4-5,L5-S1)

           

 

ELECTRONICALLY SIGNED BY MISAEL GIVENS ON

          2021 AT 11:41 AM EDT

           

           

           

 

DISCLAIMER :

THIS IS A VISIT SUMMARY EXTRACTED FROM THE [email or web address] CHART.

IT IS NOT A COPY OF THE [email or web address] PROGRESS NOTE.

FADY

## 2021-05-08 NOTE — ECWPNPC
PATIENT NAME: COBY SHIELDS

: 1990

GENDER: FEMALE

MRN: [id number]

VISIT DATE: 2021

DISCHARGE DATE: 21 1018

VISIT LOCKED DATE TIME: 

PHYSICIAN: MANJULA BARRON

RESOURCE: MANJULA BARRON

 

           

           

REASON FOR APPOINTMENT

           

          1. POST LEFT DIAGNOSTIC LUMBAR FACET BLOCK L4-5,L5-S1

           

HISTORY OF PRESENT ILLNESS

           

      DEPRESSION SCREENING:

      PHQ-2 (2015 EDITION)

           

           

          LITTLE INTEREST OR PLEASURE IN DOING THINGS?NOT AT ALL

          FEELING DOWN, DEPRESSED, OR HOPELESS?NOT AT ALL

          TOTAL SCORE0

           

      GENERAL:  HERE FOR POST PROCEDURE FOLLOW-UP. HAD

          LEFT DIAGNOSTIC LUMBAR FACET BLOCK L4-5, L5-S1 #2 ON 04/15/2021.

          REPORTING 24 HOURS % PAIN REDUCTION AND THEN PAIN GRADUALLY

          RETURNED TO BASELINE. CONTINUES TO BENEFIT FROM RIGHT LUMBAR

          RADIOFREQUENCY. DISCUSSED RADIOFREQUENCY. PATIENT WILL BE HAVING

          COVID IMMUNIZATION #2 IN THE NEXT FEW WEEKS. WE WILL SCHEDULE

          THIRD WEEK OF  FOR RADIOFREQUENCY.

           -.

           

      FALL RISK SCREENING:

      SCREENING

          : NO FALLS REPORTED IN THE LAST YEAR.

           

      PAIN SCREENING:

      PATIENT HAS A COMPLAINT OF ACUTE OR CHRONIC PAIN

           

           

          :YES

          LOCATION OF PAIN:LOW BACK

          INTENSITY OF PAIN (SCALE OF 1 TO 10):6

          WHAT DOES YOUR PAIN FEEL LIKE:ACHING, TENDER, THROBBING

          DURATION:CONTINOUS, CONSTANT, ALL DAY

          PAIN IS INCREASED BY:ACTIVITIES

          PAIN IS DECREASED BY:OTHERS HEATING PAD AND HOT BATH OR ICE

           

      NURSING NOTE:

           -.

           

      PAIN CENTER INTAKE QUESTIONS:

      DO YOU HAVE A HISTORY OF MRSA?

           

           

          :NO

           

      DO YOU TAKE A BLOOD THINNERS?

           

           

          :NO

           

      DO YOU HAVE ANY BLEEDING DISORDERS?

           

           

          :NO

           

      ANY NEW NUMBNESS OR WEAKNESS IN YOUR LEGS OR ARMS?

           

           

          :NO

           

      ANY PACEMAKER,DEFIBRILLATOR, OR DORSAL COLUMN

          STIMULATOR?

           

           

          :NO

           

      DO YOU HAVE ANY RASHES OR OPEN SORES?

           

           

          :NO

           

      ARE YOU ALLERGIC TO IV DYE?

           

           

          :NO

           

      ARE YOU DIABETIC?

           

           

          :NO

           

      ANY NEW PROBLEMS WITH YOUR MEDICATIONS?

           

           

          :NO

           

      HAVE YOU RECEIVED A VACCINE IN THE PAST 30 DAYS?

           

           

          :YES 1ST COVID SHOT 2021

           

      DO YOU PLAN TO RECEIVE A VACCINE IN THE NEXT 21

          DAYS?

           

           

          :YES

          IF SO WHAT VACCINE AND WHEN? 2ND COVID SHOT 2021

           

      DO YOU NEED ANY PRESCRIPTION?

           

           

          :NO

           

      DO YOU TAKE ANY IMMUNOSUPPRESSIVE MEDICATIONS?

           

           

          :NO

           

      IS THERE A CHANCE YOU COULD BE PREGNANT?

           

           

          :NO

           

      ARE YOU BREAST FEEDING?

           

           

          :NO

           

CURRENT MEDICATIONS

           

          TAKING FLONASE ALLERGY RELIEF 50 MCG/ACT SUSPENSION 1 SPRAY IN

          EACH NOSTRIL NASALLY ONCE A DAY

          TAKING MELATONIN 3 MG TABLET AS DIRECTED ORALLY

          TAKING CALCIUM 1000 + D 1000-800 MG-UNIT TABLET 1 TABLET WITH A

          MEAL ORALLY ONCE A DAY

          TAKING WELLBUTRIN  MG TABLET EXTENDED RELEASE 24 HOUR 1

          TABLET IN THE MORNING ORALLY ONCE A DAY

          TAKING AMBIEN 10 MG TABLET 1 TABLET AT BEDTIME AS NEEDED ORALLY

          ONCE A DAY

          TAKING VITAMIN D 1000 UNIT TABLET 1 TABLET ORALLY ONCE A DAY

          TAKING ZOLOFT 25 MG TABLET 1 TABLET ORALLY ONCE A DAY

          TAKING CEFACLOR 250 MG CAPSULE 1 CAPSULE ORALLY EVERY 8 HRS AS

          NEEDED

          TAKING GABAPENTIN 600 MG TABLET 1 TABLET ORALLY BID, NOTES: 2100

          NOT-TAKING GABAPENTIN 400 MG CAPSULE 1 CAPSULE ORALLY ONCE A DAY

          NOT-TAKING HYDROXYZINE HCL 25 MG TABLET 1 TABLET AS NEEDED ORALLY

          EVERY 8 HRS, NOTES: NOT LATELY

          NOT-TAKING NORETHINDRONE ACETATE 5 MG TABLET 1 TABLET ORALLY ONCE

          A DAY

          NOT-TAKING ESTRACE 2 MG TABLET 1 TABLET ORALLY ONCE A DAY

          NOT-TAKING NORETHINDRONE ACETATE 5 MG TABLET 1 TABLET ORALLY ONCE

          A DAY

          NOT-TAKING HYDRALAZINE HCL 25 MG TABLET 1 TABLET WITH FOOD ORALLY

          THREE TIMES A DAY

          MEDICATION LIST REVIEWED AND RECONCILED WITH THE PATIENT

           

PAST MEDICAL HISTORY

           

          UTIS

          KIDNEY CYST

          CHRONIC BACK PAIN

          MIGRAINE

          PREMATURE OVARIAN FAILURE

          OSTEOPOROSIS

          DEGENERATIVE DISK DISEASE

          ALPORTS SYNDROME

           

ALLERGIES

           

          VICODIN: N/V, RASH - ALLERGY

          CAT ALLERGY: CONGESTION - ALLERGY

          LEXAPRO: PROLONGED QT - SIDE EFFECTS

           

SOCIAL HISTORY

           

          GENERAL:

           

          TOBACCO USE

          ARE YOU A:NONSMOKER

           

           

          LATEX QUESTIONNAIRE

          LATEX ALLERGY : HAVE YOU EVER DEVELOPED ANY TYPE OF REACTION

          AFTER HANDLING LATEX PRODUCTS SUCH AS RUBBER GLOVES, CONDOMS,

          DIAPHRAGMS, BALLOONS, SOCKS, OR UNDERWEAR?NO

          LATEX ALLERGY : HAVE YOU EVER DEVELOPED ANY TYPE OF REACTION

          DURING OR AFTER DENTAL APPOINTMENT, VAGINAL/RECTAL EXAMINATION,

          SURGICAL PROCEDURE, OR ANY OTHER EXPOSURE?NO

          LATEX RISK : HAVE YOU EVER HAD ANY DIFFICULTY BREATHING OR HIVES

          AFTER EATING OR HANDLING ANY FRUITS, OR VEGETABLES; SUCH AS KIWI,

          BANANAS, STONE FRUITS, OR CHESTNUTSNO

          LATEX RISK : DO YOU HAVE A PREVIOUS PERSONAL HISTORY OF MORE THAN

          NINE SURGERIES, SPINA BIFIDA, OR REPEATED CATHERIZATIONS? NO

          LATEX RISK : ARE YOU FREQUENTLY EXPOSED TO LATEX PRODUCTS IN YOUR

          OCCUPATION?NO

          DATE ASKED : 2021

           

           

          ALCOHOL USE: NO.

           

           

          ALCOHOL SCREENING

          DID YOU HAVE A DRINK CONTAINING ALCOHOL IN THE PAST YEAR?NO

          POINTS0

          INTERPRETATIONNEGATIVE

           

           

          RECREATIONAL DRUG USE

          DRUG USE?NO

           

           

          CAFFEINE

          CAFFEINE USE?YES 2 CUPS DAY

           

           

          Jain

          [id number] NO Pentecostal BELIEFS THAT WOULD IMPACT

          HEALTH CARE.

           

           

          LANGUAGE

          LANGUAGES SPOKEN:ENGLISH

           

           

          LEARNING BARRIERS / SPECIAL NEEDS

          CHANGE FROM LAST VISIT?NO

          BARRIERS TO LEARNING?NO

          HEARING IMPAIRED?NO

          VISION IMPAIRED?YES

          :CORRECTIVE LENSES

          COGNITIVELY IMPAIRED?NO

          READINESS TO LEARN?YES

          LEARNING PREFERENCES?NO

          LEARNING CAPABILITIES PRESENT?YES

          EMOTIONAL BARRIERS?NO

          SPECIAL DEVICES?NO

           NEEDED?NO

           

           

          DOMESTIC VIOLENCE

          STATUS:

          DO YOU FEEL SAFE IN YOUR ENVIRONMENT?YES

           

           

          OCCUPATION: SELF EMPLOYED.

           

           

          DIET: REGULAR.

           

           

          EXERCISE: DAILY.

           

           

          MARITAL STATUS: .

           

           

          -

          HAS THE PATIENT BEEN EDUCATED REGARDING HIS/HER PLAN OF CARE?YES

          HAS THE PATIENT BEEN EDUCATED REGARDING PAIN, THE RISK FOR PAIN,

          THE IMPORTANCE OF EFFECTIVE PAIN MANAGEMENT, AND THE PAIN

          ASSESSMENT PROCESS?YES

           

           

          HOUSING: [employer].

           

           

          ADVANCE DIRECTIVE

          ADVANCE DIRECTIVE DISCUSSED WITH PATIENT:YES PT DOES NOT HAVE ANY

          ADVANCED DIRECTIVES AND SHE DECLINED INFORMATION ON HCP AT THIS

          TIME

           

REVIEW OF SYSTEMS

           

      CONSTITUTIONAL:

           

          ANY RECENT FEVER    NO . CHILLS    NO . WEIGHT CHANGE OF UNKNOWN

          REASONS    NO .

           

      GASTROENTEROLOGY:

           

          NEW UNEXPLAINABLE CHANGES IN BOWEL CONTROL    NO . CONSTIPATION  

           NO .

           

      GENITOURINARY:

           

          ANY NEW CHANGE IN BLADDER CONTROL?    NO .

           

      NEUROLOGY:

           

          NEW ONSET DIZZINESS OR NEUROLOGICAL CHANGES NOT MENTIONED    NO .

          NEW NUMBNESS OR PAIN PATTERNS NOT MENTIONED AND PERTINENT TO

          TODAY'S VISIT    NO .

           

      CARDIOLOGY:

           

          NEW CHEST PRESSURE    NO . PATIENT DENIES    NO .

           

      RESPIRATORY:

           

          UNEXPLAINABLE COUGH    NO . NEW SHORTNESS OF BREATH    NO .

           

VITAL SIGNS

           

          .8 LBS, HT 70 IN, BMI 25.80 INDEX, /70 MM HG, 

          /MIN, RR 18 /MIN, TEMP 96.0 F, OXYGEN SAT % 92%, SAFE IN ENV?

          (Y/N) YES, NA INITIALS SC 09:42T.SONAM GREER.

           

EXAMINATION

           

      GENERAL EXAMINATION:

          GENERAL AWAKE,ALERT ,PLEAASANT .

           

          PSYCH AFFECT NORMAL .

           

          LUNGS: LUNG FIELDS ARE CLEAR TO AUSCULTATION BILATERALLY. GOOD

          MOVEMENT OF AIR .

           

          HEART: S1, S2 IN A REGULAR RATE AND RHYTHM. NO SIGNIFICANT

          MURMURS, RUBS OR GALLOPS NOTED .

           

          LUMBAR:PALPATION:TENDER OVER LEFT. L4/5-L5/S1 LUMBAR FACETS WITH

          FACET LOADING..

           

ASSESSMENTS

           

          SPONDYLOSIS WITHOUT MYELOPATHY OR RADICULOPATHY, LUMBAR REGION -

          M47.816 (PRIMARY)

           

          OTHER CHRONIC PAIN - G89.29

           

TREATMENT

           

      SPONDYLOSIS WITHOUT MYELOPATHY OR RADICULOPATHY,

          LUMBAR REGION

          MEDICATION: VALIUM TAB 10MG ORALLY (DIAZEPAM) (ORDERED FOR

          2021)

          NOTES: LEFT LUMBAR COOL RADIOFREQUENCY L4-5,L5-S1

          REVIEWED PRE PROCEDURE INFORMATION, PATIENT VERBALIZED

          UNDERSTANDING KYRA GREER.

           

           

      OTHER CHRONIC PAIN

          PAIN PROCEDURE LOGDATE OF PROCEDURE4/15/2021PROCEDURE:LEFT

          DIAGNOSTIC LUMBAR FACET BLOCK L4-L5,L5-I8ZLMOZT OF PRE

          SEDATE0/0RESULT:24 HOURS % PAIN REDUCTION THEN PAIN

          GRADUALLY RETURNED TO BASELINE

           

PROCEDURE CODES

           

           ESTABILISHED PATIENT [facility] CHARGE

           

DISPOSITION & COMMUNICATION

           

FOLLOW UP

           

          SCHEDULE 3RD OR 4TH WK IN -COVID IMMO (REASON: LEFT LUMBAR

          COOL RADIOFREQUENCY L4-5,L5-S1)

           

 

ELECTRONICALLY SIGNED BY MISAEL GIVENS ON

          2021 AT 11:41 AM EDT

           

           

           

 

DISCLAIMER :

THIS IS A VISIT SUMMARY EXTRACTED FROM THE [employer] CHART.

IT IS NOT A COPY OF THE [employer] PROGRESS NOTE.

FADY

## 2021-05-08 NOTE — ECWPNPC
PATIENT NAME: COBY SHIELDS

: 1990

GENDER: FEMALE

MRN: [id number]

VISIT DATE: 2021

DISCHARGE DATE: 21 1018

VISIT LOCKED DATE TIME: 

PHYSICIAN: MANJULA BARRON

RESOURCE: MANJULA BARRON

 

           

           

REASON FOR APPOINTMENT

           

          1. POST LEFT DIAGNOSTIC LUMBAR FACET BLOCK L4-5,L5-S1

           

HISTORY OF PRESENT ILLNESS

           

      DEPRESSION SCREENING:

      PHQ-2 (2015 EDITION)

           

           

          LITTLE INTEREST OR PLEASURE IN DOING THINGS?NOT AT ALL

          FEELING DOWN, DEPRESSED, OR HOPELESS?NOT AT ALL

          TOTAL SCORE0

           

      GENERAL:  HERE FOR POST PROCEDURE FOLLOW-UP. HAD

          LEFT DIAGNOSTIC LUMBAR FACET BLOCK L4-5, L5-S1 #2 ON 04/15/2021.

          REPORTING 24 HOURS % PAIN REDUCTION AND THEN PAIN GRADUALLY

          RETURNED TO BASELINE. CONTINUES TO BENEFIT FROM RIGHT LUMBAR

          RADIOFREQUENCY. DISCUSSED RADIOFREQUENCY. PATIENT WILL BE HAVING

          COVID IMMUNIZATION #2 IN THE NEXT FEW WEEKS. WE WILL SCHEDULE

          THIRD WEEK OF  FOR RADIOFREQUENCY.

           -.

           

      FALL RISK SCREENING:

      SCREENING

          : NO FALLS REPORTED IN THE LAST YEAR.

           

      PAIN SCREENING:

      PATIENT HAS A COMPLAINT OF ACUTE OR CHRONIC PAIN

           

           

          :YES

          LOCATION OF PAIN:LOW BACK

          INTENSITY OF PAIN (SCALE OF 1 TO 10):6

          WHAT DOES YOUR PAIN FEEL LIKE:ACHING, TENDER, THROBBING

          DURATION:CONTINOUS, CONSTANT, ALL DAY

          PAIN IS INCREASED BY:ACTIVITIES

          PAIN IS DECREASED BY:OTHERS HEATING PAD AND HOT BATH OR ICE

           

      NURSING NOTE:

           -.

           

      PAIN CENTER INTAKE QUESTIONS:

      DO YOU HAVE A HISTORY OF MRSA?

           

           

          :NO

           

      DO YOU TAKE A BLOOD THINNERS?

           

           

          :NO

           

      DO YOU HAVE ANY BLEEDING DISORDERS?

           

           

          :NO

           

      ANY NEW NUMBNESS OR WEAKNESS IN YOUR LEGS OR ARMS?

           

           

          :NO

           

      ANY PACEMAKER,DEFIBRILLATOR, OR DORSAL COLUMN

          STIMULATOR?

           

           

          :NO

           

      DO YOU HAVE ANY RASHES OR OPEN SORES?

           

           

          :NO

           

      ARE YOU ALLERGIC TO IV DYE?

           

           

          :NO

           

      ARE YOU DIABETIC?

           

           

          :NO

           

      ANY NEW PROBLEMS WITH YOUR MEDICATIONS?

           

           

          :NO

           

      HAVE YOU RECEIVED A VACCINE IN THE PAST 30 DAYS?

           

           

          :YES 1ST COVID SHOT 2021

           

      DO YOU PLAN TO RECEIVE A VACCINE IN THE NEXT 21

          DAYS?

           

           

          :YES

          IF SO WHAT VACCINE AND WHEN? 2ND COVID SHOT 2021

           

      DO YOU NEED ANY PRESCRIPTION?

           

           

          :NO

           

      DO YOU TAKE ANY IMMUNOSUPPRESSIVE MEDICATIONS?

           

           

          :NO

           

      IS THERE A CHANCE YOU COULD BE PREGNANT?

           

           

          :NO

           

      ARE YOU BREAST FEEDING?

           

           

          :NO

           

CURRENT MEDICATIONS

           

          TAKING FLONASE ALLERGY RELIEF 50 MCG/ACT SUSPENSION 1 SPRAY IN

          EACH NOSTRIL NASALLY ONCE A DAY

          TAKING MELATONIN 3 MG TABLET AS DIRECTED ORALLY

          TAKING CALCIUM 1000 + D 1000-800 MG-UNIT TABLET 1 TABLET WITH A

          MEAL ORALLY ONCE A DAY

          TAKING WELLBUTRIN  MG TABLET EXTENDED RELEASE 24 HOUR 1

          TABLET IN THE MORNING ORALLY ONCE A DAY

          TAKING AMBIEN 10 MG TABLET 1 TABLET AT BEDTIME AS NEEDED ORALLY

          ONCE A DAY

          TAKING VITAMIN D 1000 UNIT TABLET 1 TABLET ORALLY ONCE A DAY

          TAKING ZOLOFT 25 MG TABLET 1 TABLET ORALLY ONCE A DAY

          TAKING CEFACLOR 250 MG CAPSULE 1 CAPSULE ORALLY EVERY 8 HRS AS

          NEEDED

          TAKING GABAPENTIN 600 MG TABLET 1 TABLET ORALLY BID, NOTES: 2100

          NOT-TAKING GABAPENTIN 400 MG CAPSULE 1 CAPSULE ORALLY ONCE A DAY

          NOT-TAKING HYDROXYZINE HCL 25 MG TABLET 1 TABLET AS NEEDED ORALLY

          EVERY 8 HRS, NOTES: NOT LATELY

          NOT-TAKING NORETHINDRONE ACETATE 5 MG TABLET 1 TABLET ORALLY ONCE

          A DAY

          NOT-TAKING ESTRACE 2 MG TABLET 1 TABLET ORALLY ONCE A DAY

          NOT-TAKING NORETHINDRONE ACETATE 5 MG TABLET 1 TABLET ORALLY ONCE

          A DAY

          NOT-TAKING HYDRALAZINE HCL 25 MG TABLET 1 TABLET WITH FOOD ORALLY

          THREE TIMES A DAY

          MEDICATION LIST REVIEWED AND RECONCILED WITH THE PATIENT

           

PAST MEDICAL HISTORY

           

          UTIS

          KIDNEY CYST

          CHRONIC BACK PAIN

          MIGRAINE

          PREMATURE OVARIAN FAILURE

          OSTEOPOROSIS

          DEGENERATIVE DISK DISEASE

          ALPORTS SYNDROME

           

ALLERGIES

           

          VICODIN: N/V, RASH - ALLERGY

          CAT ALLERGY: CONGESTION - ALLERGY

          LEXAPRO: PROLONGED QT - SIDE EFFECTS

           

SOCIAL HISTORY

           

          GENERAL:

           

          TOBACCO USE

          ARE YOU A:NONSMOKER

           

           

          LATEX QUESTIONNAIRE

          LATEX ALLERGY : HAVE YOU EVER DEVELOPED ANY TYPE OF REACTION

          AFTER HANDLING LATEX PRODUCTS SUCH AS RUBBER GLOVES, CONDOMS,

          DIAPHRAGMS, BALLOONS, SOCKS, OR UNDERWEAR?NO

          LATEX ALLERGY : HAVE YOU EVER DEVELOPED ANY TYPE OF REACTION

          DURING OR AFTER DENTAL APPOINTMENT, VAGINAL/RECTAL EXAMINATION,

          SURGICAL PROCEDURE, OR ANY OTHER EXPOSURE?NO

          LATEX RISK : HAVE YOU EVER HAD ANY DIFFICULTY BREATHING OR HIVES

          AFTER EATING OR HANDLING ANY FRUITS, OR VEGETABLES; SUCH AS KIWI,

          BANANAS, STONE FRUITS, OR CHESTNUTSNO

          LATEX RISK : DO YOU HAVE A PREVIOUS PERSONAL HISTORY OF MORE THAN

          NINE SURGERIES, SPINA BIFIDA, OR REPEATED CATHERIZATIONS? NO

          LATEX RISK : ARE YOU FREQUENTLY EXPOSED TO LATEX PRODUCTS IN YOUR

          OCCUPATION?NO

          DATE ASKED : 2021

           

           

          ALCOHOL USE: NO.

           

           

          ALCOHOL SCREENING

          DID YOU HAVE A DRINK CONTAINING ALCOHOL IN THE PAST YEAR?NO

          POINTS0

          INTERPRETATIONNEGATIVE

           

           

          RECREATIONAL DRUG USE

          DRUG USE?NO

           

           

          CAFFEINE

          CAFFEINE USE?YES 2 CUPS DAY

           

           

          Amish

          [id number] BELIEFS THAT WOULD IMPACT

          HEALTH CARE.

           

           

          LANGUAGE

          LANGUAGES SPOKEN:ENGLISH

           

           

          LEARNING BARRIERS / SPECIAL NEEDS

          CHANGE FROM LAST VISIT?NO

          BARRIERS TO LEARNING?NO

          HEARING IMPAIRED?NO

          VISION IMPAIRED?YES

          :CORRECTIVE LENSES

          COGNITIVELY IMPAIRED?NO

          READINESS TO LEARN?YES

          LEARNING PREFERENCES?NO

          LEARNING CAPABILITIES PRESENT?YES

          EMOTIONAL BARRIERS?NO

          SPECIAL DEVICES?NO

           NEEDED?NO

           

           

          DOMESTIC VIOLENCE

          STATUS:

          DO YOU FEEL SAFE IN YOUR ENVIRONMENT?YES

           

           

          OCCUPATION: SELF EMPLOYED.

           

           

          DIET: REGULAR.

           

           

          EXERCISE: DAILY.

           

           

          MARITAL STATUS: .

           

           

          -

          HAS THE PATIENT BEEN EDUCATED REGARDING HIS/HER PLAN OF CARE?YES

          HAS THE PATIENT BEEN EDUCATED REGARDING PAIN, THE RISK FOR PAIN,

          THE IMPORTANCE OF EFFECTIVE PAIN MANAGEMENT, AND THE PAIN

          ASSESSMENT PROCESS?YES

           

           

          HOUSING: [employer].

           

           

          ADVANCE DIRECTIVE

          ADVANCE DIRECTIVE DISCUSSED WITH PATIENT:YES PT DOES NOT HAVE ANY

          ADVANCED DIRECTIVES AND SHE DECLINED INFORMATION ON HCP AT THIS

          TIME

           

REVIEW OF SYSTEMS

           

      CONSTITUTIONAL:

           

          ANY RECENT FEVER    NO . CHILLS    NO . WEIGHT CHANGE OF UNKNOWN

          REASONS    NO .

           

      GASTROENTEROLOGY:

           

          NEW UNEXPLAINABLE CHANGES IN BOWEL CONTROL    NO . CONSTIPATION  

           NO .

           

      GENITOURINARY:

           

          ANY NEW CHANGE IN BLADDER CONTROL?    NO .

           

      NEUROLOGY:

           

          NEW ONSET DIZZINESS OR NEUROLOGICAL CHANGES NOT MENTIONED    NO .

          NEW NUMBNESS OR PAIN PATTERNS NOT MENTIONED AND PERTINENT TO

          TODAY'S VISIT    NO .

           

      CARDIOLOGY:

           

          NEW CHEST PRESSURE    NO . PATIENT DENIES    NO .

           

      RESPIRATORY:

           

          UNEXPLAINABLE COUGH    NO . NEW SHORTNESS OF BREATH    NO .

           

VITAL SIGNS

           

          .8 LBS, HT 70 IN, BMI 25.80 INDEX, /70 MM HG, 

          /MIN, RR 18 /MIN, TEMP 96.0 F, OXYGEN SAT % 92%, SAFE IN ENV?

          (Y/N) YES, NA INITIALS SC 09:42T.SONAM GREER.

           

EXAMINATION

           

      GENERAL EXAMINATION:

          GENERAL AWAKE,ALERT ,PLEAASANT .

           

          PSYCH AFFECT NORMAL .

           

          LUNGS: LUNG FIELDS ARE CLEAR TO AUSCULTATION BILATERALLY. GOOD

          MOVEMENT OF AIR .

           

          HEART: S1, S2 IN A REGULAR RATE AND RHYTHM. NO SIGNIFICANT

          MURMURS, RUBS OR GALLOPS NOTED .

           

          LUMBAR:PALPATION:TENDER OVER LEFT. L4/5-L5/S1 LUMBAR FACETS WITH

          FACET LOADING..

           

ASSESSMENTS

           

          SPONDYLOSIS WITHOUT MYELOPATHY OR RADICULOPATHY, LUMBAR REGION -

          M47.816 (PRIMARY)

           

          OTHER CHRONIC PAIN - G89.29

           

TREATMENT

           

      SPONDYLOSIS WITHOUT MYELOPATHY OR RADICULOPATHY,

          LUMBAR REGION

          MEDICATION: VALIUM TAB 10MG ORALLY (DIAZEPAM) (ORDERED FOR

          2021)

          NOTES: LEFT LUMBAR COOL RADIOFREQUENCY L4-5,L5-S1

          REVIEWED PRE PROCEDURE INFORMATION, PATIENT VERBALIZED

          UNDERSTANDING KYRA GREER.

           

           

      OTHER CHRONIC PAIN

          PAIN PROCEDURE LOGDATE OF PROCEDURE4/15/2021PROCEDURE:LEFT

          DIAGNOSTIC LUMBAR FACET BLOCK L4-L5,L5-C5KAFEGW OF PRE

          SEDATE0/0RESULT:24 HOURS % PAIN REDUCTION THEN PAIN

          GRADUALLY RETURNED TO BASELINE

           

PROCEDURE CODES

           

           ESTABILISHED PATIENT [facility] CHARGE

           

DISPOSITION & COMMUNICATION

           

FOLLOW UP

           

          SCHEDULE 3RD OR 4TH WK IN -COVID IMMO (REASON: LEFT LUMBAR

          COOL RADIOFREQUENCY L4-5,L5-S1)

           

 

ELECTRONICALLY SIGNED BY MISAEL GIVENS ON

          2021 AT 11:41 AM EDT

           

           

           

 

DISCLAIMER :

THIS IS A VISIT SUMMARY EXTRACTED FROM THE [employer] CHART.

IT IS NOT A COPY OF THE [employer] PROGRESS NOTE.

FADY

## 2021-05-08 NOTE — ECWPNPC
PATIENT NAME: COBY SHIELDS

: 1990

GENDER: FEMALE

MRN: [id number]

VISIT DATE: 2021

DISCHARGE DATE: 21 1018

VISIT LOCKED DATE TIME: 

PHYSICIAN: MANJULA BARRON

RESOURCE: MANJULA BARRON

 

           

           

REASON FOR APPOINTMENT

           

          1. POST LEFT DIAGNOSTIC LUMBAR FACET BLOCK L4-5,L5-S1

           

HISTORY OF PRESENT ILLNESS

           

      DEPRESSION SCREENING:

      PHQ-2 (2015 EDITION)

           

           

          LITTLE INTEREST OR PLEASURE IN DOING THINGS?NOT AT ALL

          FEELING DOWN, DEPRESSED, OR HOPELESS?NOT AT ALL

          TOTAL SCORE0

           

      GENERAL:  HERE FOR POST PROCEDURE FOLLOW-UP. HAD

          LEFT DIAGNOSTIC LUMBAR FACET BLOCK L4-5, L5-S1 #2 ON 04/15/2021.

          REPORTING 24 HOURS % PAIN REDUCTION AND THEN PAIN GRADUALLY

          RETURNED TO BASELINE. CONTINUES TO BENEFIT FROM RIGHT LUMBAR

          RADIOFREQUENCY. DISCUSSED RADIOFREQUENCY. PATIENT WILL BE HAVING

          COVID IMMUNIZATION #2 IN THE NEXT FEW WEEKS. WE WILL SCHEDULE

          THIRD WEEK OF  FOR RADIOFREQUENCY.

           -.

           

      FALL RISK SCREENING:

      SCREENING

          : NO FALLS REPORTED IN THE LAST YEAR.

           

      PAIN SCREENING:

      PATIENT HAS A COMPLAINT OF ACUTE OR CHRONIC PAIN

           

           

          :YES

          LOCATION OF PAIN:LOW BACK

          INTENSITY OF PAIN (SCALE OF 1 TO 10):6

          WHAT DOES YOUR PAIN FEEL LIKE:ACHING, TENDER, THROBBING

          DURATION:CONTINOUS, CONSTANT, ALL DAY

          PAIN IS INCREASED BY:ACTIVITIES

          PAIN IS DECREASED BY:OTHERS HEATING PAD AND HOT BATH OR ICE

           

      NURSING NOTE:

           -.

           

      PAIN CENTER INTAKE QUESTIONS:

      DO YOU HAVE A HISTORY OF MRSA?

           

           

          :NO

           

      DO YOU TAKE A BLOOD THINNERS?

           

           

          :NO

           

      DO YOU HAVE ANY BLEEDING DISORDERS?

           

           

          :NO

           

      ANY NEW NUMBNESS OR WEAKNESS IN YOUR LEGS OR ARMS?

           

           

          :NO

           

      ANY PACEMAKER,DEFIBRILLATOR, OR DORSAL COLUMN

          STIMULATOR?

           

           

          :NO

           

      DO YOU HAVE ANY RASHES OR OPEN SORES?

           

           

          :NO

           

      ARE YOU ALLERGIC TO IV DYE?

           

           

          :NO

           

      ARE YOU DIABETIC?

           

           

          :NO

           

      ANY NEW PROBLEMS WITH YOUR MEDICATIONS?

           

           

          :NO

           

      HAVE YOU RECEIVED A VACCINE IN THE PAST 30 DAYS?

           

           

          :YES 1ST COVID SHOT 2021

           

      DO YOU PLAN TO RECEIVE A VACCINE IN THE NEXT 21

          DAYS?

           

           

          :YES

          IF SO WHAT VACCINE AND WHEN? 2ND COVID SHOT 2021

           

      DO YOU NEED ANY PRESCRIPTION?

           

           

          :NO

           

      DO YOU TAKE ANY IMMUNOSUPPRESSIVE MEDICATIONS?

           

           

          :NO

           

      IS THERE A CHANCE YOU COULD BE PREGNANT?

           

           

          :NO

           

      ARE YOU BREAST FEEDING?

           

           

          :NO

           

CURRENT MEDICATIONS

           

          TAKING FLONASE ALLERGY RELIEF 50 MCG/ACT SUSPENSION 1 SPRAY IN

          EACH NOSTRIL NASALLY ONCE A DAY

          TAKING MELATONIN 3 MG TABLET AS DIRECTED ORALLY

          TAKING CALCIUM 1000 + D 1000-800 MG-UNIT TABLET 1 TABLET WITH A

          MEAL ORALLY ONCE A DAY

          TAKING WELLBUTRIN  MG TABLET EXTENDED RELEASE 24 HOUR 1

          TABLET IN THE MORNING ORALLY ONCE A DAY

          TAKING AMBIEN 10 MG TABLET 1 TABLET AT BEDTIME AS NEEDED ORALLY

          ONCE A DAY

          TAKING VITAMIN D 1000 UNIT TABLET 1 TABLET ORALLY ONCE A DAY

          TAKING ZOLOFT 25 MG TABLET 1 TABLET ORALLY ONCE A DAY

          TAKING CEFACLOR 250 MG CAPSULE 1 CAPSULE ORALLY EVERY 8 HRS AS

          NEEDED

          TAKING GABAPENTIN 600 MG TABLET 1 TABLET ORALLY BID, NOTES: 2100

          NOT-TAKING GABAPENTIN 400 MG CAPSULE 1 CAPSULE ORALLY ONCE A DAY

          NOT-TAKING HYDROXYZINE HCL 25 MG TABLET 1 TABLET AS NEEDED ORALLY

          EVERY 8 HRS, NOTES: NOT LATELY

          NOT-TAKING NORETHINDRONE ACETATE 5 MG TABLET 1 TABLET ORALLY ONCE

          A DAY

          NOT-TAKING ESTRACE 2 MG TABLET 1 TABLET ORALLY ONCE A DAY

          NOT-TAKING NORETHINDRONE ACETATE 5 MG TABLET 1 TABLET ORALLY ONCE

          A DAY

          NOT-TAKING HYDRALAZINE HCL 25 MG TABLET 1 TABLET WITH FOOD ORALLY

          THREE TIMES A DAY

          MEDICATION LIST REVIEWED AND RECONCILED WITH THE PATIENT

           

PAST MEDICAL HISTORY

           

          UTIS

          KIDNEY CYST

          CHRONIC BACK PAIN

          MIGRAINE

          PREMATURE OVARIAN FAILURE

          OSTEOPOROSIS

          DEGENERATIVE DISK DISEASE

          ALPORTS SYNDROME

           

ALLERGIES

           

          VICODIN: N/V, RASH - ALLERGY

          CAT ALLERGY: CONGESTION - ALLERGY

          LEXAPRO: PROLONGED QT - SIDE EFFECTS

           

SOCIAL HISTORY

           

          GENERAL:

           

          TOBACCO USE

          ARE YOU A:NONSMOKER

           

           

          LATEX QUESTIONNAIRE

          LATEX ALLERGY : HAVE YOU EVER DEVELOPED ANY TYPE OF REACTION

          AFTER HANDLING LATEX PRODUCTS SUCH AS RUBBER GLOVES, CONDOMS,

          DIAPHRAGMS, BALLOONS, SOCKS, OR UNDERWEAR?NO

          LATEX ALLERGY : HAVE YOU EVER DEVELOPED ANY TYPE OF REACTION

          DURING OR AFTER DENTAL APPOINTMENT, VAGINAL/RECTAL EXAMINATION,

          SURGICAL PROCEDURE, OR ANY OTHER EXPOSURE?NO

          LATEX RISK : HAVE YOU EVER HAD ANY DIFFICULTY BREATHING OR HIVES

          AFTER EATING OR HANDLING ANY FRUITS, OR VEGETABLES; SUCH AS KIWI,

          BANANAS, STONE FRUITS, OR CHESTNUTSNO

          LATEX RISK : DO YOU HAVE A PREVIOUS PERSONAL HISTORY OF MORE THAN

          NINE SURGERIES, SPINA BIFIDA, OR REPEATED CATHERIZATIONS? NO

          LATEX RISK : ARE YOU FREQUENTLY EXPOSED TO LATEX PRODUCTS IN YOUR

          OCCUPATION?NO

          DATE ASKED : 2021

           

           

          ALCOHOL USE: NO.

           

           

          ALCOHOL SCREENING

          DID YOU HAVE A DRINK CONTAINING ALCOHOL IN THE PAST YEAR?NO

          POINTS0

          INTERPRETATIONNEGATIVE

           

           

          RECREATIONAL DRUG USE

          DRUG USE?NO

           

           

          CAFFEINE

          CAFFEINE USE?YES 2 CUPS DAY

           

           

          Latter day

          [id number] Rastafarian NO Jain BELIEFS THAT WOULD IMPACT

          HEALTH CARE.

           

           

          LANGUAGE

          LANGUAGES SPOKEN:ENGLISH

           

           

          LEARNING BARRIERS / SPECIAL NEEDS

          CHANGE FROM LAST VISIT?NO

          BARRIERS TO LEARNING?NO

          HEARING IMPAIRED?NO

          VISION IMPAIRED?YES

          :CORRECTIVE LENSES

          COGNITIVELY IMPAIRED?NO

          READINESS TO LEARN?YES

          LEARNING PREFERENCES?NO

          LEARNING CAPABILITIES PRESENT?YES

          EMOTIONAL BARRIERS?NO

          SPECIAL DEVICES?NO

           NEEDED?NO

           

           

          DOMESTIC VIOLENCE

          STATUS:

          DO YOU FEEL SAFE IN YOUR ENVIRONMENT?YES

           

           

          OCCUPATION: SELF EMPLOYED.

           

           

          DIET: REGULAR.

           

           

          EXERCISE: DAILY.

           

           

          MARITAL STATUS: .

           

           

          -

          HAS THE PATIENT BEEN EDUCATED REGARDING HIS/HER PLAN OF CARE?YES

          HAS THE PATIENT BEEN EDUCATED REGARDING PAIN, THE RISK FOR PAIN,

          THE IMPORTANCE OF EFFECTIVE PAIN MANAGEMENT, AND THE PAIN

          ASSESSMENT PROCESS?YES

           

           

          HOUSING: [employer].

           

           

          ADVANCE DIRECTIVE

          ADVANCE DIRECTIVE DISCUSSED WITH PATIENT:YES PT DOES NOT HAVE ANY

          ADVANCED DIRECTIVES AND SHE DECLINED INFORMATION ON HCP AT THIS

          TIME

           

REVIEW OF SYSTEMS

           

      CONSTITUTIONAL:

           

          ANY RECENT FEVER    NO . CHILLS    NO . WEIGHT CHANGE OF UNKNOWN

          REASONS    NO .

           

      GASTROENTEROLOGY:

           

          NEW UNEXPLAINABLE CHANGES IN BOWEL CONTROL    NO . CONSTIPATION  

           NO .

           

      GENITOURINARY:

           

          ANY NEW CHANGE IN BLADDER CONTROL?    NO .

           

      NEUROLOGY:

           

          NEW ONSET DIZZINESS OR NEUROLOGICAL CHANGES NOT MENTIONED    NO .

          NEW NUMBNESS OR PAIN PATTERNS NOT MENTIONED AND PERTINENT TO

          TODAY'S VISIT    NO .

           

      CARDIOLOGY:

           

          NEW CHEST PRESSURE    NO . PATIENT DENIES    NO .

           

      RESPIRATORY:

           

          UNEXPLAINABLE COUGH    NO . NEW SHORTNESS OF BREATH    NO .

           

VITAL SIGNS

           

          .8 LBS, HT 70 IN, BMI 25.80 INDEX, /70 MM HG, 

          /MIN, RR 18 /MIN, TEMP 96.0 F, OXYGEN SAT % 92%, SAFE IN ENV?

          (Y/N) YES, NA INITIALS SC 09:42T.SONAM GREER.

           

EXAMINATION

           

      GENERAL EXAMINATION:

          GENERAL AWAKE,ALERT ,PLEAASANT .

           

          PSYCH AFFECT NORMAL .

           

          LUNGS: LUNG FIELDS ARE CLEAR TO AUSCULTATION BILATERALLY. GOOD

          MOVEMENT OF AIR .

           

          HEART: S1, S2 IN A REGULAR RATE AND RHYTHM. NO SIGNIFICANT

          MURMURS, RUBS OR GALLOPS NOTED .

           

          LUMBAR:PALPATION:TENDER OVER LEFT. L4/5-L5/S1 LUMBAR FACETS WITH

          FACET LOADING..

           

ASSESSMENTS

           

          SPONDYLOSIS WITHOUT MYELOPATHY OR RADICULOPATHY, LUMBAR REGION -

          M47.816 (PRIMARY)

           

          OTHER CHRONIC PAIN - G89.29

           

TREATMENT

           

      SPONDYLOSIS WITHOUT MYELOPATHY OR RADICULOPATHY,

          LUMBAR REGION

          MEDICATION: VALIUM TAB 10MG ORALLY (DIAZEPAM) (ORDERED FOR

          2021)

          NOTES: LEFT LUMBAR COOL RADIOFREQUENCY L4-5,L5-S1

          REVIEWED PRE PROCEDURE INFORMATION, PATIENT VERBALIZED

          UNDERSTANDING KYRA GREER.

           

           

      OTHER CHRONIC PAIN

          PAIN PROCEDURE LOGDATE OF PROCEDURE4/15/2021PROCEDURE:LEFT

          DIAGNOSTIC LUMBAR FACET BLOCK L4-L5,L5-X2ZLAPCZ OF PRE

          SEDATE0/0RESULT:24 HOURS % PAIN REDUCTION THEN PAIN

          GRADUALLY RETURNED TO BASELINE

           

PROCEDURE CODES

           

           ESTABILISHED PATIENT [facility] CHARGE

           

DISPOSITION & COMMUNICATION

           

FOLLOW UP

           

          SCHEDULE 3RD OR 4TH WK IN -COVID IMMO (REASON: LEFT LUMBAR

          COOL RADIOFREQUENCY L4-5,L5-S1)

           

 

ELECTRONICALLY SIGNED BY MISAEL GIVENS ON

          2021 AT 11:41 AM EDT

           

           

           

 

DISCLAIMER :

THIS IS A VISIT SUMMARY EXTRACTED FROM THE [employer] CHART.

IT IS NOT A COPY OF THE [employer] PROGRESS NOTE.

FADY

## 2021-05-08 NOTE — ECWPNPC
PATIENT NAME: COBY SHIELDS

: 1990

GENDER: FEMALE

MRN: [id number]

VISIT DATE: 2021

DISCHARGE DATE: 21 1018

VISIT LOCKED DATE TIME: 

PHYSICIAN: MANJULA BARRON

RESOURCE: MANJULA BARRON

 

           

           

REASON FOR APPOINTMENT

           

          1. POST LEFT DIAGNOSTIC LUMBAR FACET BLOCK L4-5,L5-S1

           

HISTORY OF PRESENT ILLNESS

           

      DEPRESSION SCREENING:

      PHQ-2 (2015 EDITION)

           

           

          LITTLE INTEREST OR PLEASURE IN DOING THINGS?NOT AT ALL

          FEELING DOWN, DEPRESSED, OR HOPELESS?NOT AT ALL

          TOTAL SCORE0

           

      GENERAL:  HERE FOR POST PROCEDURE FOLLOW-UP. HAD

          LEFT DIAGNOSTIC LUMBAR FACET BLOCK L4-5, L5-S1 #2 ON 04/15/2021.

          REPORTING 24 HOURS % PAIN REDUCTION AND THEN PAIN GRADUALLY

          RETURNED TO BASELINE. CONTINUES TO BENEFIT FROM RIGHT LUMBAR

          RADIOFREQUENCY. DISCUSSED RADIOFREQUENCY. PATIENT WILL BE HAVING

          COVID IMMUNIZATION #2 IN THE NEXT FEW WEEKS. WE WILL SCHEDULE

          THIRD WEEK OF  FOR RADIOFREQUENCY.

           -.

           

      FALL RISK SCREENING:

      SCREENING

          : NO FALLS REPORTED IN THE LAST YEAR.

           

      PAIN SCREENING:

      PATIENT HAS A COMPLAINT OF ACUTE OR CHRONIC PAIN

           

           

          :YES

          LOCATION OF PAIN:LOW BACK

          INTENSITY OF PAIN (SCALE OF 1 TO 10):6

          WHAT DOES YOUR PAIN FEEL LIKE:ACHING, TENDER, THROBBING

          DURATION:CONTINOUS, CONSTANT, ALL DAY

          PAIN IS INCREASED BY:ACTIVITIES

          PAIN IS DECREASED BY:OTHERS HEATING PAD AND HOT BATH OR ICE

           

      NURSING NOTE:

           -.

           

      PAIN CENTER INTAKE QUESTIONS:

      DO YOU HAVE A HISTORY OF MRSA?

           

           

          :NO

           

      DO YOU TAKE A BLOOD THINNERS?

           

           

          :NO

           

      DO YOU HAVE ANY BLEEDING DISORDERS?

           

           

          :NO

           

      ANY NEW NUMBNESS OR WEAKNESS IN YOUR LEGS OR ARMS?

           

           

          :NO

           

      ANY PACEMAKER,DEFIBRILLATOR, OR DORSAL COLUMN

          STIMULATOR?

           

           

          :NO

           

      DO YOU HAVE ANY RASHES OR OPEN SORES?

           

           

          :NO

           

      ARE YOU ALLERGIC TO IV DYE?

           

           

          :NO

           

      ARE YOU DIABETIC?

           

           

          :NO

           

      ANY NEW PROBLEMS WITH YOUR MEDICATIONS?

           

           

          :NO

           

      HAVE YOU RECEIVED A VACCINE IN THE PAST 30 DAYS?

           

           

          :YES 1ST COVID SHOT 2021

           

      DO YOU PLAN TO RECEIVE A VACCINE IN THE NEXT 21

          DAYS?

           

           

          :YES

          IF SO WHAT VACCINE AND WHEN? 2ND COVID SHOT 2021

           

      DO YOU NEED ANY PRESCRIPTION?

           

           

          :NO

           

      DO YOU TAKE ANY IMMUNOSUPPRESSIVE MEDICATIONS?

           

           

          :NO

           

      IS THERE A CHANCE YOU COULD BE PREGNANT?

           

           

          :NO

           

      ARE YOU BREAST FEEDING?

           

           

          :NO

           

CURRENT MEDICATIONS

           

          TAKING FLONASE ALLERGY RELIEF 50 MCG/ACT SUSPENSION 1 SPRAY IN

          EACH NOSTRIL NASALLY ONCE A DAY

          TAKING MELATONIN 3 MG TABLET AS DIRECTED ORALLY

          TAKING CALCIUM 1000 + D 1000-800 MG-UNIT TABLET 1 TABLET WITH A

          MEAL ORALLY ONCE A DAY

          TAKING WELLBUTRIN  MG TABLET EXTENDED RELEASE 24 HOUR 1

          TABLET IN THE MORNING ORALLY ONCE A DAY

          TAKING AMBIEN 10 MG TABLET 1 TABLET AT BEDTIME AS NEEDED ORALLY

          ONCE A DAY

          TAKING VITAMIN D 1000 UNIT TABLET 1 TABLET ORALLY ONCE A DAY

          TAKING ZOLOFT 25 MG TABLET 1 TABLET ORALLY ONCE A DAY

          TAKING CEFACLOR 250 MG CAPSULE 1 CAPSULE ORALLY EVERY 8 HRS AS

          NEEDED

          TAKING GABAPENTIN 600 MG TABLET 1 TABLET ORALLY BID, NOTES: 2100

          NOT-TAKING GABAPENTIN 400 MG CAPSULE 1 CAPSULE ORALLY ONCE A DAY

          NOT-TAKING HYDROXYZINE HCL 25 MG TABLET 1 TABLET AS NEEDED ORALLY

          EVERY 8 HRS, NOTES: NOT LATELY

          NOT-TAKING NORETHINDRONE ACETATE 5 MG TABLET 1 TABLET ORALLY ONCE

          A DAY

          NOT-TAKING ESTRACE 2 MG TABLET 1 TABLET ORALLY ONCE A DAY

          NOT-TAKING NORETHINDRONE ACETATE 5 MG TABLET 1 TABLET ORALLY ONCE

          A DAY

          NOT-TAKING HYDRALAZINE HCL 25 MG TABLET 1 TABLET WITH FOOD ORALLY

          THREE TIMES A DAY

          MEDICATION LIST REVIEWED AND RECONCILED WITH THE PATIENT

           

PAST MEDICAL HISTORY

           

          UTIS

          KIDNEY CYST

          CHRONIC BACK PAIN

          MIGRAINE

          PREMATURE OVARIAN FAILURE

          OSTEOPOROSIS

          DEGENERATIVE DISK DISEASE

          ALPORTS SYNDROME

           

ALLERGIES

           

          VICODIN: N/V, RASH - ALLERGY

          CAT ALLERGY: CONGESTION - ALLERGY

          LEXAPRO: PROLONGED QT - SIDE EFFECTS

           

SOCIAL HISTORY

           

          GENERAL:

           

          TOBACCO USE

          ARE YOU A:NONSMOKER

           

           

          LATEX QUESTIONNAIRE

          LATEX ALLERGY : HAVE YOU EVER DEVELOPED ANY TYPE OF REACTION

          AFTER HANDLING LATEX PRODUCTS SUCH AS RUBBER GLOVES, CONDOMS,

          DIAPHRAGMS, BALLOONS, SOCKS, OR UNDERWEAR?NO

          LATEX ALLERGY : HAVE YOU EVER DEVELOPED ANY TYPE OF REACTION

          DURING OR AFTER DENTAL APPOINTMENT, VAGINAL/RECTAL EXAMINATION,

          SURGICAL PROCEDURE, OR ANY OTHER EXPOSURE?NO

          LATEX RISK : HAVE YOU EVER HAD ANY DIFFICULTY BREATHING OR HIVES

          AFTER EATING OR HANDLING ANY FRUITS, OR VEGETABLES; SUCH AS KIWI,

          BANANAS, STONE FRUITS, OR CHESTNUTSNO

          LATEX RISK : DO YOU HAVE A PREVIOUS PERSONAL HISTORY OF MORE THAN

          NINE SURGERIES, SPINA BIFIDA, OR REPEATED CATHERIZATIONS? NO

          LATEX RISK : ARE YOU FREQUENTLY EXPOSED TO LATEX PRODUCTS IN YOUR

          OCCUPATION?NO

          DATE ASKED : 2021

           

           

          ALCOHOL USE: NO.

           

           

          ALCOHOL SCREENING

          DID YOU HAVE A DRINK CONTAINING ALCOHOL IN THE PAST YEAR?NO

          POINTS0

          INTERPRETATIONNEGATIVE

           

           

          RECREATIONAL DRUG USE

          DRUG USE?NO

           

           

          CAFFEINE

          CAFFEINE USE?YES 2 CUPS DAY

           

           

          [place]

          [id number] Jain NO Mosque BELIEFS THAT WOULD IMPACT

          HEALTH CARE.

           

           

          LANGUAGE

          LANGUAGES SPOKEN:ENGLISH

           

           

          LEARNING BARRIERS / SPECIAL NEEDS

          CHANGE FROM LAST VISIT?NO

          BARRIERS TO LEARNING?NO

          HEARING IMPAIRED?NO

          VISION IMPAIRED?YES

          :CORRECTIVE LENSES

          COGNITIVELY IMPAIRED?NO

          READINESS TO LEARN?YES

          LEARNING PREFERENCES?NO

          LEARNING CAPABILITIES PRESENT?YES

          EMOTIONAL BARRIERS?NO

          SPECIAL DEVICES?NO

           NEEDED?NO

           

           

          DOMESTIC VIOLENCE

          STATUS:

          DO YOU FEEL SAFE IN YOUR ENVIRONMENT?YES

           

           

          OCCUPATION: SELF EMPLOYED.

           

           

          DIET: REGULAR.

           

           

          EXERCISE: DAILY.

           

           

          MARITAL STATUS: .

           

           

          -

          HAS THE PATIENT BEEN EDUCATED REGARDING HIS/HER PLAN OF CARE?YES

          HAS THE PATIENT BEEN EDUCATED REGARDING PAIN, THE RISK FOR PAIN,

          THE IMPORTANCE OF EFFECTIVE PAIN MANAGEMENT, AND THE PAIN

          ASSESSMENT PROCESS?YES

           

           

          HOUSING: [employer].

           

           

          ADVANCE DIRECTIVE

          ADVANCE DIRECTIVE DISCUSSED WITH PATIENT:YES PT DOES NOT HAVE ANY

          ADVANCED DIRECTIVES AND SHE DECLINED INFORMATION ON HCP AT THIS

          TIME

           

REVIEW OF SYSTEMS

           

      CONSTITUTIONAL:

           

          ANY RECENT FEVER    NO . CHILLS    NO . WEIGHT CHANGE OF UNKNOWN

          REASONS    NO .

           

      GASTROENTEROLOGY:

           

          NEW UNEXPLAINABLE CHANGES IN BOWEL CONTROL    NO . CONSTIPATION  

           NO .

           

      GENITOURINARY:

           

          ANY NEW CHANGE IN BLADDER CONTROL?    NO .

           

      NEUROLOGY:

           

          NEW ONSET DIZZINESS OR NEUROLOGICAL CHANGES NOT MENTIONED    NO .

          NEW NUMBNESS OR PAIN PATTERNS NOT MENTIONED AND PERTINENT TO

          TODAY'S VISIT    NO .

           

      CARDIOLOGY:

           

          NEW CHEST PRESSURE    NO . PATIENT DENIES    NO .

           

      RESPIRATORY:

           

          UNEXPLAINABLE COUGH    NO . NEW SHORTNESS OF BREATH    NO .

           

VITAL SIGNS

           

          .8 LBS, HT 70 IN, BMI 25.80 INDEX, /70 MM HG, 

          /MIN, RR 18 /MIN, TEMP 96.0 F, OXYGEN SAT % 92%, SAFE IN ENV?

          (Y/N) YES, NA INITIALS SC 09:42T.SONAM GREER.

           

EXAMINATION

           

      GENERAL EXAMINATION:

          GENERAL AWAKE,ALERT ,PLEAASANT .

           

          PSYCH AFFECT NORMAL .

           

          LUNGS: LUNG FIELDS ARE CLEAR TO AUSCULTATION BILATERALLY. GOOD

          MOVEMENT OF AIR .

           

          HEART: S1, S2 IN A REGULAR RATE AND RHYTHM. NO SIGNIFICANT

          MURMURS, RUBS OR GALLOPS NOTED .

           

          LUMBAR:PALPATION:TENDER OVER LEFT. L4/5-L5/S1 LUMBAR FACETS WITH

          FACET LOADING..

           

ASSESSMENTS

           

          SPONDYLOSIS WITHOUT MYELOPATHY OR RADICULOPATHY, LUMBAR REGION -

          M47.816 (PRIMARY)

           

          OTHER CHRONIC PAIN - G89.29

           

TREATMENT

           

      SPONDYLOSIS WITHOUT MYELOPATHY OR RADICULOPATHY,

          LUMBAR REGION

          MEDICATION: VALIUM TAB 10MG ORALLY (DIAZEPAM) (ORDERED FOR

          2021)

          NOTES: LEFT LUMBAR COOL RADIOFREQUENCY L4-5,L5-S1

          REVIEWED PRE PROCEDURE INFORMATION, PATIENT VERBALIZED

          UNDERSTANDING KYRA GREER.

           

           

      OTHER CHRONIC PAIN

          PAIN PROCEDURE LOGDATE OF PROCEDURE4/15/2021PROCEDURE:LEFT

          DIAGNOSTIC LUMBAR FACET BLOCK L4-L5,L5-B1KJPRWD OF PRE

          SEDATE0/0RESULT:24 HOURS % PAIN REDUCTION THEN PAIN

          GRADUALLY RETURNED TO BASELINE

           

PROCEDURE CODES

           

           ESTABILISHED PATIENT [facility] CHARGE

           

DISPOSITION & COMMUNICATION

           

FOLLOW UP

           

          SCHEDULE 3RD OR 4TH WK IN -COVID IMMO (REASON: LEFT LUMBAR

          COOL RADIOFREQUENCY L4-5,L5-S1)

           

 

ELECTRONICALLY SIGNED BY MISAEL GIVENS ON

          2021 AT 11:41 AM EDT

           

           

           

 

DISCLAIMER :

THIS IS A VISIT SUMMARY EXTRACTED FROM THE [employer] CHART.

IT IS NOT A COPY OF THE [employer] PROGRESS NOTE.

FADY

## 2021-05-08 NOTE — ECWPNPC
PATIENT NAME: COBY SHIELDS

: 1990

GENDER: FEMALE

MRN: [id number]

VISIT DATE: 2021

DISCHARGE DATE: 21 1018

VISIT LOCKED DATE TIME: 

PHYSICIAN: MANJULA BARRON

RESOURCE: MANJULA BARRON

 

           

           

REASON FOR APPOINTMENT

           

          1. POST LEFT DIAGNOSTIC LUMBAR FACET BLOCK L4-5,L5-S1

           

HISTORY OF PRESENT ILLNESS

           

      DEPRESSION SCREENING:

      PHQ-2 (2015 EDITION)

           

           

          LITTLE INTEREST OR PLEASURE IN DOING THINGS?NOT AT ALL

          FEELING DOWN, DEPRESSED, OR HOPELESS?NOT AT ALL

          TOTAL SCORE0

           

      GENERAL:  HERE FOR POST PROCEDURE FOLLOW-UP. HAD

          LEFT DIAGNOSTIC LUMBAR FACET BLOCK L4-5, L5-S1 #2 ON 04/15/2021.

          REPORTING 24 HOURS % PAIN REDUCTION AND THEN PAIN GRADUALLY

          RETURNED TO BASELINE. CONTINUES TO BENEFIT FROM RIGHT LUMBAR

          RADIOFREQUENCY. DISCUSSED RADIOFREQUENCY. PATIENT WILL BE HAVING

          COVID IMMUNIZATION #2 IN THE NEXT FEW WEEKS. WE WILL SCHEDULE

          THIRD WEEK OF  FOR RADIOFREQUENCY.

           -.

           

      FALL RISK SCREENING:

      SCREENING

          : NO FALLS REPORTED IN THE LAST YEAR.

           

      PAIN SCREENING:

      PATIENT HAS A COMPLAINT OF ACUTE OR CHRONIC PAIN

           

           

          :YES

          LOCATION OF PAIN:LOW BACK

          INTENSITY OF PAIN (SCALE OF 1 TO 10):6

          WHAT DOES YOUR PAIN FEEL LIKE:ACHING, TENDER, THROBBING

          DURATION:CONTINOUS, CONSTANT, ALL DAY

          PAIN IS INCREASED BY:ACTIVITIES

          PAIN IS DECREASED BY:OTHERS HEATING PAD AND HOT BATH OR ICE

           

      NURSING NOTE:

           -.

           

      PAIN CENTER INTAKE QUESTIONS:

      DO YOU HAVE A HISTORY OF MRSA?

           

           

          :NO

           

      DO YOU TAKE A BLOOD THINNERS?

           

           

          :NO

           

      DO YOU HAVE ANY BLEEDING DISORDERS?

           

           

          :NO

           

      ANY NEW NUMBNESS OR WEAKNESS IN YOUR LEGS OR ARMS?

           

           

          :NO

           

      ANY PACEMAKER,DEFIBRILLATOR, OR DORSAL COLUMN

          STIMULATOR?

           

           

          :NO

           

      DO YOU HAVE ANY RASHES OR OPEN SORES?

           

           

          :NO

           

      ARE YOU ALLERGIC TO IV DYE?

           

           

          :NO

           

      ARE YOU DIABETIC?

           

           

          :NO

           

      ANY NEW PROBLEMS WITH YOUR MEDICATIONS?

           

           

          :NO

           

      HAVE YOU RECEIVED A VACCINE IN THE PAST 30 DAYS?

           

           

          :YES 1ST COVID SHOT 2021

           

      DO YOU PLAN TO RECEIVE A VACCINE IN THE NEXT 21

          DAYS?

           

           

          :YES

          IF SO WHAT VACCINE AND WHEN? 2ND COVID SHOT 2021

           

      DO YOU NEED ANY PRESCRIPTION?

           

           

          :NO

           

      DO YOU TAKE ANY IMMUNOSUPPRESSIVE MEDICATIONS?

           

           

          :NO

           

      IS THERE A CHANCE YOU COULD BE PREGNANT?

           

           

          :NO

           

      ARE YOU BREAST FEEDING?

           

           

          :NO

           

CURRENT MEDICATIONS

           

          TAKING FLONASE ALLERGY RELIEF 50 MCG/ACT SUSPENSION 1 SPRAY IN

          EACH NOSTRIL NASALLY ONCE A DAY

          TAKING MELATONIN 3 MG TABLET AS DIRECTED ORALLY

          TAKING CALCIUM 1000 + D 1000-800 MG-UNIT TABLET 1 TABLET WITH A

          MEAL ORALLY ONCE A DAY

          TAKING WELLBUTRIN  MG TABLET EXTENDED RELEASE 24 HOUR 1

          TABLET IN THE MORNING ORALLY ONCE A DAY

          TAKING AMBIEN 10 MG TABLET 1 TABLET AT BEDTIME AS NEEDED ORALLY

          ONCE A DAY

          TAKING VITAMIN D 1000 UNIT TABLET 1 TABLET ORALLY ONCE A DAY

          TAKING ZOLOFT 25 MG TABLET 1 TABLET ORALLY ONCE A DAY

          TAKING CEFACLOR 250 MG CAPSULE 1 CAPSULE ORALLY EVERY 8 HRS AS

          NEEDED

          TAKING GABAPENTIN 600 MG TABLET 1 TABLET ORALLY BID, NOTES: 2100

          NOT-TAKING GABAPENTIN 400 MG CAPSULE 1 CAPSULE ORALLY ONCE A DAY

          NOT-TAKING HYDROXYZINE HCL 25 MG TABLET 1 TABLET AS NEEDED ORALLY

          EVERY 8 HRS, NOTES: NOT LATELY

          NOT-TAKING NORETHINDRONE ACETATE 5 MG TABLET 1 TABLET ORALLY ONCE

          A DAY

          NOT-TAKING ESTRACE 2 MG TABLET 1 TABLET ORALLY ONCE A DAY

          NOT-TAKING NORETHINDRONE ACETATE 5 MG TABLET 1 TABLET ORALLY ONCE

          A DAY

          NOT-TAKING HYDRALAZINE HCL 25 MG TABLET 1 TABLET WITH FOOD ORALLY

          THREE TIMES A DAY

          MEDICATION LIST REVIEWED AND RECONCILED WITH THE PATIENT

           

PAST MEDICAL HISTORY

           

          UTIS

          KIDNEY CYST

          CHRONIC BACK PAIN

          MIGRAINE

          PREMATURE OVARIAN FAILURE

          OSTEOPOROSIS

          DEGENERATIVE DISK DISEASE

          ALPORTS SYNDROME

           

ALLERGIES

           

          VICODIN: N/V, RASH - ALLERGY

          CAT ALLERGY: CONGESTION - ALLERGY

          LEXAPRO: PROLONGED QT - SIDE EFFECTS

           

SOCIAL HISTORY

           

          GENERAL:

           

          TOBACCO USE

          ARE YOU A:NONSMOKER

           

           

          LATEX QUESTIONNAIRE

          LATEX ALLERGY : HAVE YOU EVER DEVELOPED ANY TYPE OF REACTION

          AFTER HANDLING LATEX PRODUCTS SUCH AS RUBBER GLOVES, CONDOMS,

          DIAPHRAGMS, BALLOONS, SOCKS, OR UNDERWEAR?NO

          LATEX ALLERGY : HAVE YOU EVER DEVELOPED ANY TYPE OF REACTION

          DURING OR AFTER DENTAL APPOINTMENT, VAGINAL/RECTAL EXAMINATION,

          SURGICAL PROCEDURE, OR ANY OTHER EXPOSURE?NO

          LATEX RISK : HAVE YOU EVER HAD ANY DIFFICULTY BREATHING OR HIVES

          AFTER EATING OR HANDLING ANY FRUITS, OR VEGETABLES; SUCH AS KIWI,

          BANANAS, STONE FRUITS, OR CHESTNUTSNO

          LATEX RISK : DO YOU HAVE A PREVIOUS PERSONAL HISTORY OF MORE THAN

          NINE SURGERIES, SPINA BIFIDA, OR REPEATED CATHERIZATIONS? NO

          LATEX RISK : ARE YOU FREQUENTLY EXPOSED TO LATEX PRODUCTS IN YOUR

          OCCUPATION?NO

          DATE ASKED : 2021

           

           

          ALCOHOL USE: NO.

           

           

          ALCOHOL SCREENING

          DID YOU HAVE A DRINK CONTAINING ALCOHOL IN THE PAST YEAR?NO

          POINTS0

          INTERPRETATIONNEGATIVE

           

           

          RECREATIONAL DRUG USE

          DRUG USE?NO

           

           

          CAFFEINE

          CAFFEINE USE?YES 2 CUPS DAY

           

           

          Muslim

          [id number] Lutheran NO Hindu BELIEFS THAT WOULD IMPACT

          HEALTH CARE.

           

           

          LANGUAGE

          LANGUAGES SPOKEN:ENGLISH

           

           

          LEARNING BARRIERS / SPECIAL NEEDS

          CHANGE FROM LAST VISIT?NO

          BARRIERS TO LEARNING?NO

          HEARING IMPAIRED?NO

          VISION IMPAIRED?YES

          :CORRECTIVE LENSES

          COGNITIVELY IMPAIRED?NO

          READINESS TO LEARN?YES

          LEARNING PREFERENCES?NO

          LEARNING CAPABILITIES PRESENT?YES

          EMOTIONAL BARRIERS?NO

          SPECIAL DEVICES?NO

           NEEDED?NO

           

           

          DOMESTIC VIOLENCE

          STATUS:

          DO YOU FEEL SAFE IN YOUR ENVIRONMENT?YES

           

           

          OCCUPATION: SELF EMPLOYED.

           

           

          DIET: REGULAR.

           

           

          EXERCISE: DAILY.

           

           

          MARITAL STATUS: .

           

           

          -

          HAS THE PATIENT BEEN EDUCATED REGARDING HIS/HER PLAN OF CARE?YES

          HAS THE PATIENT BEEN EDUCATED REGARDING PAIN, THE RISK FOR PAIN,

          THE IMPORTANCE OF EFFECTIVE PAIN MANAGEMENT, AND THE PAIN

          ASSESSMENT PROCESS?YES

           

           

          HOUSING: [employer].

           

           

          ADVANCE DIRECTIVE

          ADVANCE DIRECTIVE DISCUSSED WITH PATIENT:YES PT DOES NOT HAVE ANY

          ADVANCED DIRECTIVES AND SHE DECLINED INFORMATION ON HCP AT THIS

          TIME

           

REVIEW OF SYSTEMS

           

      CONSTITUTIONAL:

           

          ANY RECENT FEVER    NO . CHILLS    NO . WEIGHT CHANGE OF UNKNOWN

          REASONS    NO .

           

      GASTROENTEROLOGY:

           

          NEW UNEXPLAINABLE CHANGES IN BOWEL CONTROL    NO . CONSTIPATION  

           NO .

           

      GENITOURINARY:

           

          ANY NEW CHANGE IN BLADDER CONTROL?    NO .

           

      NEUROLOGY:

           

          NEW ONSET DIZZINESS OR NEUROLOGICAL CHANGES NOT MENTIONED    NO .

          NEW NUMBNESS OR PAIN PATTERNS NOT MENTIONED AND PERTINENT TO

          TODAY'S VISIT    NO .

           

      CARDIOLOGY:

           

          NEW CHEST PRESSURE    NO . PATIENT DENIES    NO .

           

      RESPIRATORY:

           

          UNEXPLAINABLE COUGH    NO . NEW SHORTNESS OF BREATH    NO .

           

VITAL SIGNS

           

          .8 LBS, HT 70 IN, BMI 25.80 INDEX, /70 MM HG, 

          /MIN, RR 18 /MIN, TEMP 96.0 F, OXYGEN SAT % 92%, SAFE IN ENV?

          (Y/N) YES, NA INITIALS SC 09:42T.SONAM GREER.

           

EXAMINATION

           

      GENERAL EXAMINATION:

          GENERAL AWAKE,ALERT ,PLEAASANT .

           

          PSYCH AFFECT NORMAL .

           

          LUNGS: LUNG FIELDS ARE CLEAR TO AUSCULTATION BILATERALLY. GOOD

          MOVEMENT OF AIR .

           

          HEART: S1, S2 IN A REGULAR RATE AND RHYTHM. NO SIGNIFICANT

          MURMURS, RUBS OR GALLOPS NOTED .

           

          LUMBAR:PALPATION:TENDER OVER LEFT. L4/5-L5/S1 LUMBAR FACETS WITH

          FACET LOADING..

           

ASSESSMENTS

           

          SPONDYLOSIS WITHOUT MYELOPATHY OR RADICULOPATHY, LUMBAR REGION -

          M47.816 (PRIMARY)

           

          OTHER CHRONIC PAIN - G89.29

           

TREATMENT

           

      SPONDYLOSIS WITHOUT MYELOPATHY OR RADICULOPATHY,

          LUMBAR REGION

          MEDICATION: VALIUM TAB 10MG ORALLY (DIAZEPAM) (ORDERED FOR

          2021)

          NOTES: LEFT LUMBAR COOL RADIOFREQUENCY L4-5,L5-S1

          REVIEWED PRE PROCEDURE INFORMATION, PATIENT VERBALIZED

          UNDERSTANDING KYRA GREER.

           

           

      OTHER CHRONIC PAIN

          PAIN PROCEDURE LOGDATE OF PROCEDURE4/15/2021PROCEDURE:LEFT

          DIAGNOSTIC LUMBAR FACET BLOCK L4-L5,L5-T7VJEITF OF PRE

          SEDATE0/0RESULT:24 HOURS % PAIN REDUCTION THEN PAIN

          GRADUALLY RETURNED TO BASELINE

           

PROCEDURE CODES

           

           ESTABILISHED PATIENT [facility] CHARGE

           

DISPOSITION & COMMUNICATION

           

FOLLOW UP

           

          SCHEDULE 3RD OR 4TH WK IN -COVID IMMO (REASON: LEFT LUMBAR

          COOL RADIOFREQUENCY L4-5,L5-S1)

           

 

ELECTRONICALLY SIGNED BY MISAEL GIVENS ON

          2021 AT 11:41 AM EDT

           

           

           

 

DISCLAIMER :

THIS IS A VISIT SUMMARY EXTRACTED FROM THE [employer] CHART.

IT IS NOT A COPY OF THE [employer] PROGRESS NOTE.

FADY

## 2021-05-08 NOTE — ECWPNPC
PATIENT NAME: COBY SHIELDS

: 1990

GENDER: FEMALE

MRN: [id number]

VISIT DATE: 2021

DISCHARGE DATE: 21 1018

VISIT LOCKED DATE TIME: 

PHYSICIAN: MANJULA BARRON

RESOURCE: MANJULA BARRON

 

           

           

REASON FOR APPOINTMENT

           

          1. POST LEFT DIAGNOSTIC LUMBAR FACET BLOCK L4-5,L5-S1

           

HISTORY OF PRESENT ILLNESS

           

      DEPRESSION SCREENING:

      PHQ-2 (2015 EDITION)

           

           

          LITTLE INTEREST OR PLEASURE IN DOING THINGS?NOT AT ALL

          FEELING DOWN, DEPRESSED, OR HOPELESS?NOT AT ALL

          TOTAL SCORE0

           

      GENERAL:  HERE FOR POST PROCEDURE FOLLOW-UP. HAD

          LEFT DIAGNOSTIC LUMBAR FACET BLOCK L4-5, L5-S1 #2 ON 04/15/2021.

          REPORTING 24 HOURS % PAIN REDUCTION AND THEN PAIN GRADUALLY

          RETURNED TO BASELINE. CONTINUES TO BENEFIT FROM RIGHT LUMBAR

          RADIOFREQUENCY. DISCUSSED RADIOFREQUENCY. PATIENT WILL BE HAVING

          COVID IMMUNIZATION #2 IN THE NEXT FEW WEEKS. WE WILL SCHEDULE

          THIRD WEEK OF  FOR RADIOFREQUENCY.

           -.

           

      FALL RISK SCREENING:

      SCREENING

          : NO FALLS REPORTED IN THE LAST YEAR.

           

      PAIN SCREENING:

      PATIENT HAS A COMPLAINT OF ACUTE OR CHRONIC PAIN

           

           

          :YES

          LOCATION OF PAIN:LOW BACK

          INTENSITY OF PAIN (SCALE OF 1 TO 10):6

          WHAT DOES YOUR PAIN FEEL LIKE:ACHING, TENDER, THROBBING

          DURATION:CONTINOUS, CONSTANT, ALL DAY

          PAIN IS INCREASED BY:ACTIVITIES

          PAIN IS DECREASED BY:OTHERS HEATING PAD AND HOT BATH OR ICE

           

      NURSING NOTE:

           -.

           

      PAIN CENTER INTAKE QUESTIONS:

      DO YOU HAVE A HISTORY OF MRSA?

           

           

          :NO

           

      DO YOU TAKE A BLOOD THINNERS?

           

           

          :NO

           

      DO YOU HAVE ANY BLEEDING DISORDERS?

           

           

          :NO

           

      ANY NEW NUMBNESS OR WEAKNESS IN YOUR LEGS OR ARMS?

           

           

          :NO

           

      ANY PACEMAKER,DEFIBRILLATOR, OR DORSAL COLUMN

          STIMULATOR?

           

           

          :NO

           

      DO YOU HAVE ANY RASHES OR OPEN SORES?

           

           

          :NO

           

      ARE YOU ALLERGIC TO IV DYE?

           

           

          :NO

           

      ARE YOU DIABETIC?

           

           

          :NO

           

      ANY NEW PROBLEMS WITH YOUR MEDICATIONS?

           

           

          :NO

           

      HAVE YOU RECEIVED A VACCINE IN THE PAST 30 DAYS?

           

           

          :YES 1ST COVID SHOT 2021

           

      DO YOU PLAN TO RECEIVE A VACCINE IN THE NEXT 21

          DAYS?

           

           

          :YES

          IF SO WHAT VACCINE AND WHEN? 2ND COVID SHOT 2021

           

      DO YOU NEED ANY PRESCRIPTION?

           

           

          :NO

           

      DO YOU TAKE ANY IMMUNOSUPPRESSIVE MEDICATIONS?

           

           

          :NO

           

      IS THERE A CHANCE YOU COULD BE PREGNANT?

           

           

          :NO

           

      ARE YOU BREAST FEEDING?

           

           

          :NO

           

CURRENT MEDICATIONS

           

          TAKING FLONASE ALLERGY RELIEF 50 MCG/ACT SUSPENSION 1 SPRAY IN

          EACH NOSTRIL NASALLY ONCE A DAY

          TAKING MELATONIN 3 MG TABLET AS DIRECTED ORALLY

          TAKING CALCIUM 1000 + D 1000-800 MG-UNIT TABLET 1 TABLET WITH A

          MEAL ORALLY ONCE A DAY

          TAKING WELLBUTRIN  MG TABLET EXTENDED RELEASE 24 HOUR 1

          TABLET IN THE MORNING ORALLY ONCE A DAY

          TAKING AMBIEN 10 MG TABLET 1 TABLET AT BEDTIME AS NEEDED ORALLY

          ONCE A DAY

          TAKING VITAMIN D 1000 UNIT TABLET 1 TABLET ORALLY ONCE A DAY

          TAKING ZOLOFT 25 MG TABLET 1 TABLET ORALLY ONCE A DAY

          TAKING CEFACLOR 250 MG CAPSULE 1 CAPSULE ORALLY EVERY 8 HRS AS

          NEEDED

          TAKING GABAPENTIN 600 MG TABLET 1 TABLET ORALLY BID, NOTES: 2100

          NOT-TAKING GABAPENTIN 400 MG CAPSULE 1 CAPSULE ORALLY ONCE A DAY

          NOT-TAKING HYDROXYZINE HCL 25 MG TABLET 1 TABLET AS NEEDED ORALLY

          EVERY 8 HRS, NOTES: NOT LATELY

          NOT-TAKING NORETHINDRONE ACETATE 5 MG TABLET 1 TABLET ORALLY ONCE

          A DAY

          NOT-TAKING ESTRACE 2 MG TABLET 1 TABLET ORALLY ONCE A DAY

          NOT-TAKING NORETHINDRONE ACETATE 5 MG TABLET 1 TABLET ORALLY ONCE

          A DAY

          NOT-TAKING HYDRALAZINE HCL 25 MG TABLET 1 TABLET WITH FOOD ORALLY

          THREE TIMES A DAY

          MEDICATION LIST REVIEWED AND RECONCILED WITH THE PATIENT

           

PAST MEDICAL HISTORY

           

          UTIS

          KIDNEY CYST

          CHRONIC BACK PAIN

          MIGRAINE

          PREMATURE OVARIAN FAILURE

          OSTEOPOROSIS

          DEGENERATIVE DISK DISEASE

          ALPORTS SYNDROME

           

ALLERGIES

           

          VICODIN: N/V, RASH - ALLERGY

          CAT ALLERGY: CONGESTION - ALLERGY

          LEXAPRO: PROLONGED QT - SIDE EFFECTS

           

SOCIAL HISTORY

           

          GENERAL:

           

          TOBACCO USE

          ARE YOU A:NONSMOKER

           

           

          LATEX QUESTIONNAIRE

          LATEX ALLERGY : HAVE YOU EVER DEVELOPED ANY TYPE OF REACTION

          AFTER HANDLING LATEX PRODUCTS SUCH AS RUBBER GLOVES, CONDOMS,

          DIAPHRAGMS, BALLOONS, SOCKS, OR UNDERWEAR?NO

          LATEX ALLERGY : HAVE YOU EVER DEVELOPED ANY TYPE OF REACTION

          DURING OR AFTER DENTAL APPOINTMENT, VAGINAL/RECTAL EXAMINATION,

          SURGICAL PROCEDURE, OR ANY OTHER EXPOSURE?NO

          LATEX RISK : HAVE YOU EVER HAD ANY DIFFICULTY BREATHING OR HIVES

          AFTER EATING OR HANDLING ANY FRUITS, OR VEGETABLES; SUCH AS KIWI,

          BANANAS, STONE FRUITS, OR CHESTNUTSNO

          LATEX RISK : DO YOU HAVE A PREVIOUS PERSONAL HISTORY OF MORE THAN

          NINE SURGERIES, SPINA BIFIDA, OR REPEATED CATHERIZATIONS? NO

          LATEX RISK : ARE YOU FREQUENTLY EXPOSED TO LATEX PRODUCTS IN YOUR

          OCCUPATION?NO

          DATE ASKED : 2021

           

           

          ALCOHOL USE: NO.

           

           

          ALCOHOL SCREENING

          DID YOU HAVE A DRINK CONTAINING ALCOHOL IN THE PAST YEAR?NO

          POINTS0

          INTERPRETATIONNEGATIVE

           

           

          RECREATIONAL DRUG USE

          DRUG USE?NO

           

           

          CAFFEINE

          CAFFEINE USE?YES 2 CUPS DAY

           

           

          Restorationism

          [id number] Sabianist NO Zoroastrian BELIEFS THAT WOULD IMPACT

          HEALTH CARE.

           

           

          LANGUAGE

          LANGUAGES SPOKEN:ENGLISH

           

           

          LEARNING BARRIERS / SPECIAL NEEDS

          CHANGE FROM LAST VISIT?NO

          BARRIERS TO LEARNING?NO

          HEARING IMPAIRED?NO

          VISION IMPAIRED?YES

          :CORRECTIVE LENSES

          COGNITIVELY IMPAIRED?NO

          READINESS TO LEARN?YES

          LEARNING PREFERENCES?NO

          LEARNING CAPABILITIES PRESENT?YES

          EMOTIONAL BARRIERS?NO

          SPECIAL DEVICES?NO

           NEEDED?NO

           

           

          DOMESTIC VIOLENCE

          STATUS:

          DO YOU FEEL SAFE IN YOUR ENVIRONMENT?YES

           

           

          OCCUPATION: SELF EMPLOYED.

           

           

          DIET: REGULAR.

           

           

          EXERCISE: DAILY.

           

           

          MARITAL STATUS: .

           

           

          -

          HAS THE PATIENT BEEN EDUCATED REGARDING HIS/HER PLAN OF CARE?YES

          HAS THE PATIENT BEEN EDUCATED REGARDING PAIN, THE RISK FOR PAIN,

          THE IMPORTANCE OF EFFECTIVE PAIN MANAGEMENT, AND THE PAIN

          ASSESSMENT PROCESS?YES

           

           

          HOUSING: [employer].

           

           

          ADVANCE DIRECTIVE

          ADVANCE DIRECTIVE DISCUSSED WITH PATIENT:YES PT DOES NOT HAVE ANY

          ADVANCED DIRECTIVES AND SHE DECLINED INFORMATION ON HCP AT THIS

          TIME

           

REVIEW OF SYSTEMS

           

      CONSTITUTIONAL:

           

          ANY RECENT FEVER    NO . CHILLS    NO . WEIGHT CHANGE OF UNKNOWN

          REASONS    NO .

           

      GASTROENTEROLOGY:

           

          NEW UNEXPLAINABLE CHANGES IN BOWEL CONTROL    NO . CONSTIPATION  

           NO .

           

      GENITOURINARY:

           

          ANY NEW CHANGE IN BLADDER CONTROL?    NO .

           

      NEUROLOGY:

           

          NEW ONSET DIZZINESS OR NEUROLOGICAL CHANGES NOT MENTIONED    NO .

          NEW NUMBNESS OR PAIN PATTERNS NOT MENTIONED AND PERTINENT TO

          TODAY'S VISIT    NO .

           

      CARDIOLOGY:

           

          NEW CHEST PRESSURE    NO . PATIENT DENIES    NO .

           

      RESPIRATORY:

           

          UNEXPLAINABLE COUGH    NO . NEW SHORTNESS OF BREATH    NO .

           

VITAL SIGNS

           

          .8 LBS, HT 70 IN, BMI 25.80 INDEX, /70 MM HG, 

          /MIN, RR 18 /MIN, TEMP 96.0 F, OXYGEN SAT % 92%, SAFE IN ENV?

          (Y/N) YES, NA INITIALS SC 09:42T.SONAM GREER.

           

EXAMINATION

           

      GENERAL EXAMINATION:

          GENERAL AWAKE,ALERT ,PLEAASANT .

           

          PSYCH AFFECT NORMAL .

           

          LUNGS: LUNG FIELDS ARE CLEAR TO AUSCULTATION BILATERALLY. GOOD

          MOVEMENT OF AIR .

           

          HEART: S1, S2 IN A REGULAR RATE AND RHYTHM. NO SIGNIFICANT

          MURMURS, RUBS OR GALLOPS NOTED .

           

          LUMBAR:PALPATION:TENDER OVER LEFT. L4/5-L5/S1 LUMBAR FACETS WITH

          FACET LOADING..

           

ASSESSMENTS

           

          SPONDYLOSIS WITHOUT MYELOPATHY OR RADICULOPATHY, LUMBAR REGION -

          M47.816 (PRIMARY)

           

          OTHER CHRONIC PAIN - G89.29

           

TREATMENT

           

      SPONDYLOSIS WITHOUT MYELOPATHY OR RADICULOPATHY,

          LUMBAR REGION

          MEDICATION: VALIUM TAB 10MG ORALLY (DIAZEPAM) (ORDERED FOR

          2021)

          NOTES: LEFT LUMBAR COOL RADIOFREQUENCY L4-5,L5-S1

          REVIEWED PRE PROCEDURE INFORMATION, PATIENT VERBALIZED

          UNDERSTANDING KYRA GREER.

           

           

      OTHER CHRONIC PAIN

          PAIN PROCEDURE LOGDATE OF PROCEDURE4/15/2021PROCEDURE:LEFT

          DIAGNOSTIC LUMBAR FACET BLOCK L4-L5,L5-E6XFBNWE OF PRE

          SEDATE0/0RESULT:24 HOURS % PAIN REDUCTION THEN PAIN

          GRADUALLY RETURNED TO BASELINE

           

PROCEDURE CODES

           

           ESTABILISHED PATIENT [facility] CHARGE

           

DISPOSITION & COMMUNICATION

           

FOLLOW UP

           

          SCHEDULE 3RD OR 4TH WK IN -COVID IMMO (REASON: LEFT LUMBAR

          COOL RADIOFREQUENCY L4-5,L5-S1)

           

 

ELECTRONICALLY SIGNED BY MISAEL GIVENS ON

          2021 AT 11:41 AM EDT

           

           

           

 

DISCLAIMER :

THIS IS A VISIT SUMMARY EXTRACTED FROM THE [employer] CHART.

IT IS NOT A COPY OF THE [employer] PROGRESS NOTE.

FADY

## 2021-07-03 ENCOUNTER — HOSPITAL ENCOUNTER (OUTPATIENT)
Dept: HOSPITAL 53 - M LABSMTC | Age: 31
End: 2021-07-03
Attending: ANESTHESIOLOGY
Payer: COMMERCIAL

## 2021-07-03 DIAGNOSIS — Z20.828: Primary | ICD-10-CM

## 2021-07-03 DIAGNOSIS — Z11.59: ICD-10-CM

## 2021-07-08 ENCOUNTER — HOSPITAL ENCOUNTER (OUTPATIENT)
Dept: HOSPITAL 53 - M PAIN | Age: 31
End: 2021-07-08
Attending: ANESTHESIOLOGY
Payer: COMMERCIAL

## 2021-07-08 DIAGNOSIS — M47.817: ICD-10-CM

## 2021-07-08 DIAGNOSIS — Z88.8: ICD-10-CM

## 2021-07-08 DIAGNOSIS — Z88.5: ICD-10-CM

## 2021-07-08 DIAGNOSIS — G43.909: ICD-10-CM

## 2021-07-08 DIAGNOSIS — M47.816: Primary | ICD-10-CM

## 2021-07-08 DIAGNOSIS — Z79.899: ICD-10-CM

## 2021-07-08 PROCEDURE — 64635 DESTROY LUMB/SAC FACET JNT: CPT

## 2021-07-08 PROCEDURE — 64636 DESTROY L/S FACET JNT ADDL: CPT
